# Patient Record
Sex: MALE | Race: WHITE | NOT HISPANIC OR LATINO | Employment: FULL TIME | URBAN - METROPOLITAN AREA
[De-identification: names, ages, dates, MRNs, and addresses within clinical notes are randomized per-mention and may not be internally consistent; named-entity substitution may affect disease eponyms.]

---

## 2017-02-22 ENCOUNTER — GENERIC CONVERSION - ENCOUNTER (OUTPATIENT)
Dept: OTHER | Facility: OTHER | Age: 42
End: 2017-02-22

## 2017-03-23 ENCOUNTER — ALLSCRIPTS OFFICE VISIT (OUTPATIENT)
Dept: OTHER | Facility: OTHER | Age: 42
End: 2017-03-23

## 2017-03-23 ENCOUNTER — APPOINTMENT (OUTPATIENT)
Dept: LAB | Facility: CLINIC | Age: 42
End: 2017-03-23
Payer: COMMERCIAL

## 2017-03-23 ENCOUNTER — TRANSCRIBE ORDERS (OUTPATIENT)
Dept: LAB | Facility: CLINIC | Age: 42
End: 2017-03-23

## 2017-03-23 DIAGNOSIS — M25.541 ARTHRALGIA OF RIGHT HAND: ICD-10-CM

## 2017-03-23 LAB
BASOPHILS # BLD AUTO: 0.02 THOUSANDS/ΜL (ref 0–0.1)
BASOPHILS NFR BLD AUTO: 0 % (ref 0–1)
CRP SERPL QL: <3 MG/L
EOSINOPHIL # BLD AUTO: 0.04 THOUSAND/ΜL (ref 0–0.61)
EOSINOPHIL NFR BLD AUTO: 1 % (ref 0–6)
ERYTHROCYTE [DISTWIDTH] IN BLOOD BY AUTOMATED COUNT: 12.2 % (ref 11.6–15.1)
ERYTHROCYTE [SEDIMENTATION RATE] IN BLOOD: 2 MM/HOUR (ref 0–10)
HCT VFR BLD AUTO: 46.9 % (ref 36.5–49.3)
HGB BLD-MCNC: 16.2 G/DL (ref 12–17)
LYMPHOCYTES # BLD AUTO: 1.63 THOUSANDS/ΜL (ref 0.6–4.47)
LYMPHOCYTES NFR BLD AUTO: 31 % (ref 14–44)
MCH RBC QN AUTO: 31.2 PG (ref 26.8–34.3)
MCHC RBC AUTO-ENTMCNC: 34.5 G/DL (ref 31.4–37.4)
MCV RBC AUTO: 90 FL (ref 82–98)
MONOCYTES # BLD AUTO: 0.35 THOUSAND/ΜL (ref 0.17–1.22)
MONOCYTES NFR BLD AUTO: 7 % (ref 4–12)
NEUTROPHILS # BLD AUTO: 3.31 THOUSANDS/ΜL (ref 1.85–7.62)
NEUTS SEG NFR BLD AUTO: 61 % (ref 43–75)
NRBC BLD AUTO-RTO: 0 /100 WBCS
PLATELET # BLD AUTO: 262 THOUSANDS/UL (ref 149–390)
PMV BLD AUTO: 10.3 FL (ref 8.9–12.7)
RBC # BLD AUTO: 5.19 MILLION/UL (ref 3.88–5.62)
WBC # BLD AUTO: 5.35 THOUSAND/UL (ref 4.31–10.16)

## 2017-03-23 PROCEDURE — 86618 LYME DISEASE ANTIBODY: CPT

## 2017-03-23 PROCEDURE — 85025 COMPLETE CBC W/AUTO DIFF WBC: CPT

## 2017-03-23 PROCEDURE — 86430 RHEUMATOID FACTOR TEST QUAL: CPT

## 2017-03-23 PROCEDURE — 85652 RBC SED RATE AUTOMATED: CPT

## 2017-03-23 PROCEDURE — 86038 ANTINUCLEAR ANTIBODIES: CPT

## 2017-03-23 PROCEDURE — 36415 COLL VENOUS BLD VENIPUNCTURE: CPT

## 2017-03-23 PROCEDURE — 86140 C-REACTIVE PROTEIN: CPT

## 2017-03-24 ENCOUNTER — GENERIC CONVERSION - ENCOUNTER (OUTPATIENT)
Dept: OTHER | Facility: OTHER | Age: 42
End: 2017-03-24

## 2017-03-24 LAB
B BURGDOR IGG SER IA-ACNC: 0.07
B BURGDOR IGM SER IA-ACNC: 0.16
RHEUMATOID FACT SER QL LA: NEGATIVE
RYE IGE QN: NEGATIVE

## 2017-04-02 ENCOUNTER — HOSPITAL ENCOUNTER (OUTPATIENT)
Dept: RADIOLOGY | Facility: HOSPITAL | Age: 42
Discharge: HOME | End: 2017-04-02
Payer: COMMERCIAL

## 2017-04-02 DIAGNOSIS — M25.541 ARTHRALGIA OF RIGHT HAND: ICD-10-CM

## 2017-04-02 PROCEDURE — 73130 X-RAY EXAM OF HAND: CPT

## 2017-04-04 ENCOUNTER — GENERIC CONVERSION - ENCOUNTER (OUTPATIENT)
Dept: OTHER | Facility: OTHER | Age: 42
End: 2017-04-04

## 2017-08-03 ENCOUNTER — APPOINTMENT (OUTPATIENT)
Dept: LAB | Facility: CLINIC | Age: 42
End: 2017-08-03
Payer: COMMERCIAL

## 2017-08-03 ENCOUNTER — ALLSCRIPTS OFFICE VISIT (OUTPATIENT)
Dept: OTHER | Facility: OTHER | Age: 42
End: 2017-08-03

## 2017-08-03 ENCOUNTER — TRANSCRIBE ORDERS (OUTPATIENT)
Dept: LAB | Facility: CLINIC | Age: 42
End: 2017-08-03

## 2017-08-03 DIAGNOSIS — R53.81 MALAISE: Primary | ICD-10-CM

## 2017-08-03 DIAGNOSIS — M25.50 PAIN IN JOINT: ICD-10-CM

## 2017-08-03 DIAGNOSIS — R53.81 OTHER MALAISE: ICD-10-CM

## 2017-08-03 DIAGNOSIS — R53.83 OTHER FATIGUE: ICD-10-CM

## 2017-08-03 LAB
25(OH)D3 SERPL-MCNC: 20.3 NG/ML (ref 30–100)
ALBUMIN SERPL BCP-MCNC: 4 G/DL (ref 3.5–5)
ALP SERPL-CCNC: 64 U/L (ref 46–116)
ALT SERPL W P-5'-P-CCNC: 20 U/L (ref 12–78)
ANION GAP SERPL CALCULATED.3IONS-SCNC: 6 MMOL/L (ref 4–13)
AST SERPL W P-5'-P-CCNC: 13 U/L (ref 5–45)
BASOPHILS # BLD AUTO: 0.03 THOUSANDS/ΜL (ref 0–0.1)
BASOPHILS NFR BLD AUTO: 1 % (ref 0–1)
BILIRUB SERPL-MCNC: 0.4 MG/DL (ref 0.2–1)
BUN SERPL-MCNC: 9 MG/DL (ref 5–25)
CALCIUM SERPL-MCNC: 8.9 MG/DL (ref 8.3–10.1)
CHLORIDE SERPL-SCNC: 103 MMOL/L (ref 100–108)
CO2 SERPL-SCNC: 32 MMOL/L (ref 21–32)
CREAT SERPL-MCNC: 0.79 MG/DL (ref 0.6–1.3)
CRP SERPL QL: <3 MG/L
EOSINOPHIL # BLD AUTO: 0.06 THOUSAND/ΜL (ref 0–0.61)
EOSINOPHIL NFR BLD AUTO: 1 % (ref 0–6)
ERYTHROCYTE [DISTWIDTH] IN BLOOD BY AUTOMATED COUNT: 12.9 % (ref 11.6–15.1)
ERYTHROCYTE [SEDIMENTATION RATE] IN BLOOD: 1 MM/HOUR (ref 0–10)
GFR SERPL CREATININE-BSD FRML MDRD: 112 ML/MIN/1.73SQ M
GLUCOSE SERPL-MCNC: 109 MG/DL (ref 65–140)
HCT VFR BLD AUTO: 44.1 % (ref 36.5–49.3)
HGB BLD-MCNC: 14.7 G/DL (ref 12–17)
LYMPHOCYTES # BLD AUTO: 1.59 THOUSANDS/ΜL (ref 0.6–4.47)
LYMPHOCYTES NFR BLD AUTO: 30 % (ref 14–44)
MCH RBC QN AUTO: 30 PG (ref 26.8–34.3)
MCHC RBC AUTO-ENTMCNC: 33.3 G/DL (ref 31.4–37.4)
MCV RBC AUTO: 90 FL (ref 82–98)
MONOCYTES # BLD AUTO: 0.45 THOUSAND/ΜL (ref 0.17–1.22)
MONOCYTES NFR BLD AUTO: 9 % (ref 4–12)
NEUTROPHILS # BLD AUTO: 3.12 THOUSANDS/ΜL (ref 1.85–7.62)
NEUTS SEG NFR BLD AUTO: 59 % (ref 43–75)
PLATELET # BLD AUTO: 265 THOUSANDS/UL (ref 149–390)
PMV BLD AUTO: 9.6 FL (ref 8.9–12.7)
POTASSIUM SERPL-SCNC: 4.3 MMOL/L (ref 3.5–5.3)
PROT SERPL-MCNC: 6.9 G/DL (ref 6.4–8.2)
RBC # BLD AUTO: 4.9 MILLION/UL (ref 3.88–5.62)
SODIUM SERPL-SCNC: 141 MMOL/L (ref 136–145)
TSH SERPL DL<=0.05 MIU/L-ACNC: 1.13 UIU/ML (ref 0.36–3.74)
WBC # BLD AUTO: 5.25 THOUSAND/UL (ref 4.31–10.16)

## 2017-08-03 PROCEDURE — 86803 HEPATITIS C AB TEST: CPT

## 2017-08-03 PROCEDURE — 85652 RBC SED RATE AUTOMATED: CPT

## 2017-08-03 PROCEDURE — 36415 COLL VENOUS BLD VENIPUNCTURE: CPT

## 2017-08-03 PROCEDURE — 86704 HEP B CORE ANTIBODY TOTAL: CPT

## 2017-08-03 PROCEDURE — 86705 HEP B CORE ANTIBODY IGM: CPT

## 2017-08-03 PROCEDURE — 87389 HIV-1 AG W/HIV-1&-2 AB AG IA: CPT

## 2017-08-03 PROCEDURE — 82306 VITAMIN D 25 HYDROXY: CPT

## 2017-08-03 PROCEDURE — 80053 COMPREHEN METABOLIC PANEL: CPT

## 2017-08-03 PROCEDURE — 81374 HLA I TYPING 1 ANTIGEN LR: CPT

## 2017-08-03 PROCEDURE — 85025 COMPLETE CBC W/AUTO DIFF WBC: CPT

## 2017-08-03 PROCEDURE — 86140 C-REACTIVE PROTEIN: CPT

## 2017-08-03 PROCEDURE — 86200 CCP ANTIBODY: CPT

## 2017-08-03 PROCEDURE — 84443 ASSAY THYROID STIM HORMONE: CPT

## 2017-08-03 PROCEDURE — 87340 HEPATITIS B SURFACE AG IA: CPT

## 2017-08-04 LAB
HBV CORE AB SER QL: NORMAL
HBV CORE IGM SER QL: NORMAL
HBV SURFACE AG SER QL: NORMAL
HCV AB SER QL: NORMAL

## 2017-08-05 LAB
CCP IGA+IGG SERPL IA-ACNC: 2 UNITS (ref 0–19)
HIV 1+2 AB+HIV1 P24 AG SERPL QL IA: NORMAL

## 2017-08-08 LAB — HLA-B27 QL NAA+PROBE: NEGATIVE

## 2017-10-23 ENCOUNTER — ALLSCRIPTS OFFICE VISIT (OUTPATIENT)
Dept: OTHER | Facility: OTHER | Age: 42
End: 2017-10-23

## 2018-01-11 NOTE — PROGRESS NOTES
Assessment    1  Polyarthralgia (719 49) (M25 50)   2  Malaise and fatigue (780 79) (R53 81,R53 83)   3  Sacroiliitis (720 2) (M46 1)   4  Aortic valve disorder (424 1) (I35 9)   5  Anxiety disorder (300 00) (F41 9)    Plan  Malaise and fatigue    · (1) CBC/PLT/DIFF; Status:Active; Requested for:24Hqm6064;    · (1) CHRONIC HEPATITIS PANEL; Status:Active; Requested for:58Omd6530;    · (1) COMPREHENSIVE METABOLIC PANEL; Status:Active; Requested for:69Vug6104;    · (1) C-REACTIVE PROTEIN; Status:Active; Requested ZHM:27XOM3995;    · (1) CYCLIC CITRULLINATED PEPTIDE; Status:Active; Requested for:86Eqr7515;    · (1) HIV AG/AB COMBO, 4TH GEN; [Do Not Release]; Status:Canceled;    · (1) HLA B27; Status:Active; Requested for:19Wch2228;    · (1) SED RATE; Status:Active; Requested for:83Vis8231;    · (1) TSH; Status:Active; Requested for:58Tfh5400;    · (1) VITAMIN D 25-HYDROXY; Status:Active; Requested for:17Ekv8285;    · Call [phone] if: The pain seems worse ; Status:Complete;   Done: 50WYR7393   · Call [phone] if: The symptoms seem worse ; Status:Complete;   Done:  76FDI9309   · Call [phone] if: You have questions or concerns about your problem ;  Status:Complete;   Done: 20NVM0676   · Follow-up visit in 1 month Evaluation and Treatment  Follow-up  Status: Complete  Done:  88UKB2085  Polyarthralgia    · (1) HIV AG/AB COMBO, 4TH GEN; [Do Not Release]; Status:Active; Requested  for:31Oad6821;     Discussion/Summary    This is a 81-year-old  gentleman presenting today for initial evaluation for an episode of right hand pain that has been present for almost a year  He states that last fall he began having right hand pain with sudden onset  He states that he has had consistent pain in his hands since however does note that the colder weather makes his pain worse  He also states that he has noticed some swelling in the fourth and fifth digit of the right hand   He also describes a history of low back pain for the last 2 years as well as bilateral knee pain  The patient denies any further obvious joint swelling but does have morning stiffness lasting about an hour however there are days where the stiffness lasts all day  He states that he has no difficulty with sleep but does note some non-restorative sleep as well as fatigue  He was seen by his primary care physician where blood work was ordered as well as x-rays  He states that he has not utilized any over-the-counter or prescription medications for his joint discomfort  He has not had any injury to his hand  The patient does however state that he completed physical therapy for the diagnosis of sacroiliitis with improvement  On physical examination, there is no active synovitis  Patient has no tenderness of his hands however does have some restricted range of motion of the right knee  He does have normal passive range of motion of both upper extremities as well as the left lower extremity  He has 5 out of 5 strength in the upper and lower extremities  There is some or stricture range of motion of lumbar spine with extension  Patient has crepitus of bilateral knees  Patient's labs reveal a Lyme, RADHA, and rheumatoid factor to be negative  Patient's last CBC, CMP, sedimentation rate, and CRP were within normal range  Patient had a negative HIV  At this time patient's history and physical examination is not consistent with any specific autoimmune condition  Due to patient's history of low back pain as well as bilateral knee pain, we will obtain further labs including a CBC, CMP, CRP, ESR, TSH, vitamin D level, as well as a CCP and HLA-B27  In addition I will obtain a hepatitis panel and HIV  Patient will plan to return to the office in one month time for further evaluation  In addition he was offered an anti-inflammatory for his current joint complaints however he declined        Counseling  Rheumatology Counseling Documentation: The patient was counseled regarding diagnostic results, instructions for management, prognosis, patient and family education, risks and benefits of treatment options and importance of compliance with treatment  Chief Complaint  Polyarthralgia      History of Present Illness  This is a 40year old male  presenting today for initial evaluation for episode of pain in the right hand x almost a year  Started on of the sudden  hand has been consistent  Some knee pain  +Swelling in the right hand main the 5th and 4th digit  Worse with colder weather  no other obvious joint swelling  +Am stiffness x 1 hour to the whole day  Improvement with warmer weather  no difficulty with sleep  +some non restorative sleep  +Some fatigue  No OTC medication for pain  Had blood work and Xrays  no injury to the hand  History of sacroiliitis and has had PT with relief  Review of Systems    Constitutional: fatigue, but no fever, no recent weight gain, no chills, no recent weight loss and no anorexia  HEENT: feeling congested and sore throat, but no blurred vision, no double vision, no amaurosis fugax, no dryness of the eyes, no eye pain, no erythema eye(s), no dryness mouth and no mouth sores  Cardiovascular: no chest pain or pressure, no dyspnea on exertion and no swelling in the arms or legs  Respiratory: no shortness of breath and no pleurisy    The patient presents with complaints of unusual or persistent cough, described as dry  Gastrointestinal: no abdominal pain, no nausea, no vomiting, no heartburn, no diarrhea, no constipation, no melena and no BRBPR  Genitourinary: no foamy urine and increased frequency, but no dysuria and no hematuria  Musculoskeletal: as noted in HPI  Integumentary nail changes, but no rash, no Raynaud's, no alopecia and no photosensitivity  Endocrine no polyuria and no polydipsia  Hematologic/Lymphatic: no unusual bleeding and no tendency for easy bruising     Neurological: headache, but no vertigo or dizziness, no tingling and no weakness  Psychiatric: insomnia, non-restorative sleep, depression and anxiety  Active Problems    1  Anxiety disorder (300 00) (F41 9)   2  Aortic valve disorder (424 1) (I35 9)   3  Arthralgia of right hand (719 44) (M25 541)   4  Cough (786 2) (R05)   5  Cough variant asthma (493 82) (J45 991)   6  Flu vaccine need (V04 81) (Z23)   7  Lumbar spondylolysis (738 4) (M43 06)   8  Sacroiliitis (720 2) (M46 1)   9  Seasonal allergies (477 9) (J30 2)    Past Medical History    1  Cough (786 2) (R05)   2  History of Depression (311) (F32 9)   3  History of acute sinusitis (V12 69) (Z87 09)   4  History of acute sinusitis (V12 69) (Z87 09)   5  History of allergic rhinitis (V12 69) (Z87 09)   6  History of viral gastroenteritis (V12 09) (Z86 19)   7  History of Migraine headache (346 90) (G43 909)   8  History of Palpitations (785 1) (R00 2)   9  History of Postnasal drip (784 91) (R09 82)   10  History of Sorethroat (462) (J02 9)    Surgical History    1  History of Tonsillectomy    Family History  Mother    1  Family history of Depression  Brother    2  Family history of Adopted  Family History    3  Denied: Family history of Crohn's disease   4  Denied: Family history of psoriasis   5  Denied: Family history of rheumatoid arthritis   6  Denied: Family history of systemic lupus erythematosus   7  Denied: Family history of ulcerative colitis   8  Family history of Father  At Age ___    Social History    · Alcohol Use (History)   · Denied: Drug Use (305 90)   · Employed   · Never smoker    Current Meds   1  Flonase 50 MCG/ACT SUSP; Therapy: (Recorded:25Obr8338) to Recorded   2  LORazepam 0 5 MG Oral Tablet; Take 1 tablet daily; Therapy: (Jas Burgos) to Recorded   3  Vitamin B12 TABS; 1 tablet every other day; Therapy: (Recorded:67Xdz6034) to Recorded    Allergies    1   Iron    Vitals  Signs   Recorded: 51Mmh8136 01:49PM   Heart Rate: 80  Systolic: 220  Diastolic: 60  Height: 5 ft 11 in  Weight: 152 lb 2 oz  BMI Calculated: 21 22  BSA Calculated: 1 88    Physical Exam    Constitutional   General appearance: No acute distress, well appearing and well nourished  Eyes   Conjunctiva and lids: No swelling, erythema or discharge  Pupils and irises: Equal, round and reactive to light  Ears, Nose, Mouth, and Throat   External inspection of ears and nose: Normal     Oropharynx: Normal with no erythema, edema, exudate lesions, or ulcers  Pulmonary   Respiratory effort: No increased work of breathing or signs of respiratory distress  Auscultation of lungs: Clear to auscultation  Cardiovascular   Auscultation of heart: Normal rate and rhythm, normal S1 and S2, without murmurs  Examination of extremities for edema and/or varicosities: Normal     Lymphatic   Palpation of lymph nodes in neck: No lymphadenopathy  Psychiatric   Orientation to person, place, and time: Normal     Mood and affect: Normal         Right knee restricted ROM  Right Upper Extremity: Normal ROM  Left Upper Extremity: Normal ROM  Left Lower Extremity: Normal ROM  Musculoskeletal - Joints, bones, and muscles: Abnormal  Palpation - bilateral knee crepitus  Muscle strength/tone: Normal    Right upper extremity: shoulder abduction 5/5, biceps 5/5, triceps 5/5, but normal hand   Left upper extremity shoulder abduction 5/5, biceps 5/5, triceps 5/5, but normal hand   Right lower extremity strength: hip flexion 5/5, hip internal rotation 5/5, hip external rotation 5/5, knee flexion 5/5, knee extension 5/5, ankle dorsiflexion 5/5, ankle plantar flexion 5/5  Left lower extremity strength: hip flexion 5/5, hip internal rotation 5/5, hip external rotation 5/5, knee flexion 5/5, knee extension 5/5, ankle dorsiflexion 5/5, ankle plantar flexion 5/5  Additional Findings - Restricted ROM of lumbar spine with extension due to discomfort       Right hand: All MCP, PIP and DIP joints are normal  Left Hand: All MCP, PIP and DIP joints are normal    Right foot: All MTP, PIP and DIP joints are normal  Left foot: All MTP, PIP and DIP joints are normal       Results/Data  (1) CBC/PLT/DIFF [id number] 11:15AM Ravi Jiménez Order Number: [id number]     Test Name Result Flag Reference   WBC COUNT 5 35 Thousand/uL  4 31-10 16   RBC COUNT 5 19 Million/uL  3 88-5 62   HEMOGLOBIN 16 2 g/dL  12 0-17 0   HEMATOCRIT 46 9 %  36 5-49 3   MCV 90 fL  82-98   MCH 31 2 pg  26 8-34 3   MCHC 34 5 g/dL  31 4-37 4   RDW 12 2 %  11 6-15 1   MPV 10 3 fL  8 9-12 7   PLATELET COUNT 748 Thousands/uL  149-390   nRBC AUTOMATED 0 /100 WBCs     NEUTROPHILS RELATIVE PERCENT 61 %  43-75   LYMPHOCYTES RELATIVE PERCENT 31 %  14-44   MONOCYTES RELATIVE PERCENT 7 %  4-12   EOSINOPHILS RELATIVE PERCENT 1 %  0-6   BASOPHILS RELATIVE PERCENT 0 %  0-1   NEUTROPHILS ABSOLUTE COUNT 3 31 Thousands/? ??L  1 85-7 62   LYMPHOCYTES ABSOLUTE COUNT 1 63 Thousands/? ??L  0 60-4 47   MONOCYTES ABSOLUTE COUNT 0 35 Thousand/? ??L  0 17-1 22   EOSINOPHILS ABSOLUTE COUNT 0 04 Thousand/? ??L  0 00-0 61   BASOPHILS ABSOLUTE COUNT 0 02 Thousands/? ??L  0 00-0 10   - Patient Instructions: This bloodwork is non-fasting  Please drink two glasses of water morning of bloodwork  - Patient Instructions: This bloodwork is non-fasting  Please drink two glasses of water morning of bloodwork       (1) RHEUMATOID FACTOR SCREEN 13RFI5218 11:15AM Ziebelette Ohm   TW Order Number: [id number]     Test Name Result Flag Reference   RHEUMATOID FACTOR Negative  Negative     (1) SED RATE 74YQT7941 11:15AM Ziebelette Ohm   TW Order Number: [id number]     Test Name Result Flag Reference   SED RATE 2 mm/hour  0-10     (1) C-REACTIVE PROTEIN [id number] 11:15AM [facility] Order Number: [id number]     Test Name Result Flag Reference   C-REACT PROTEIN <3 0 mg/L  <3 0     (1) RADHA SCREEN W/REFLEX TO TITER/PATTERN [id number] 11: 93GT Bev Coburn   TW Order Number: [id number]     Test Name Result Flag Reference   RADHA SCREEN  Negative  Negative     (1) LYME ANTIBODY PROFILE W/REFLEX TO WESTERN BLOT [id number] 11:15AM Bev Coburn    Order Number: [id number]     Test Name Result Flag Reference   LYME IGG 0 07  0 00-0 79   NEGATIVE(0 00-0 79)-Absence of detectable Borrelia IgG Antibodies  A negative result does not exclude the possibility of Borrelia infection  If early Lyme disease is suspected,a second sample should be collected & tested 4 weeks after initial testing  LYME IGM 0 16  0 00-0 79   NEGATIVE (0 00-0 79)-Absence of detectable Borrelia IgM antibodies  A negative result does not exclude the possibility of Borrelia infection  If early lyme disease is suspected, a second sample should be collected & tested 4 weeks after initial testing  Attending Note  Collaborating Physician: I did not interview and examine the patient, I discussed the case with the Advanced Practitioner and reviewed the note and I agree with the Advanced Practitioner note  Future Appointments    Date/Time Provider Specialty Site   08/08/2017 01:00 PM Karen Randhawa DO Rheumatology [facility]   09/12/2017 04:00 PM LÓPEZ Lopez Rheumatology [address]     Signatures   Electronically signed by : Farooq Cantrell [facility];  Aug  3 2017  2:40PM EST                       (Author)    Electronically signed by : Kaylie Mukherjee DO; Aug  3 2017  4:24PM EST                       (Author)

## 2018-01-12 NOTE — RESULT NOTES
Verified Results  (1) CBC/PLT/DIFF [id number] 11:15AM Vijay Liu Order Number: [id number]     Test Name Result Flag Reference   WBC COUNT 5 35 Thousand/uL  4 31-10 16   RBC COUNT 5 19 Million/uL  3 88-5 62   HEMOGLOBIN 16 2 g/dL  12 0-17 0   HEMATOCRIT 46 9 %  36 5-49 3   MCV 90 fL  82-98   MCH 31 2 pg  26 8-34 3   MCHC 34 5 g/dL  31 4-37 4   RDW 12 2 %  11 6-15 1   MPV 10 3 fL  8 9-12 7   PLATELET COUNT 603 Thousands/uL  149-390   nRBC AUTOMATED 0 /100 WBCs     NEUTROPHILS RELATIVE PERCENT 61 %  43-75   LYMPHOCYTES RELATIVE PERCENT 31 %  14-44   MONOCYTES RELATIVE PERCENT 7 %  4-12   EOSINOPHILS RELATIVE PERCENT 1 %  0-6   BASOPHILS RELATIVE PERCENT 0 %  0-1   NEUTROPHILS ABSOLUTE COUNT 3 31 Thousands/? ??L  1 85-7 62   LYMPHOCYTES ABSOLUTE COUNT 1 63 Thousands/? ??L  0 60-4 47   MONOCYTES ABSOLUTE COUNT 0 35 Thousand/? ??L  0 17-1 22   EOSINOPHILS ABSOLUTE COUNT 0 04 Thousand/? ??L  0 00-0 61   BASOPHILS ABSOLUTE COUNT 0 02 Thousands/? ??L  0 00-0 10   - Patient Instructions: This bloodwork is non-fasting  Please drink two glasses of water morning of bloodwork  - Patient Instructions: This bloodwork is non-fasting  Please drink two glasses of water morning of bloodwork       (1) RHEUMATOID FACTOR SCREEN [id number] 11:15AM Noemi Mantua    Order Number: [id number]     Test Name Result Flag Reference   RHEUMATOID FACTOR Negative  Negative     (1) SED RATE [id number] 11:15AM Noemi Mantua   TW Order Number: [id number]     Test Name Result Flag Reference   SED RATE 2 mm/hour  0-10     (1) C-REACTIVE PROTEIN [id number] 11:15AM localbaconricky Mantua    Order Number: [id number]     Test Name Result Flag Reference   C-REACT PROTEIN <3 0 mg/L  <3 0

## 2018-01-14 VITALS
HEART RATE: 72 BPM | BODY MASS INDEX: 20.58 KG/M2 | WEIGHT: 147 LBS | RESPIRATION RATE: 18 BRPM | DIASTOLIC BLOOD PRESSURE: 76 MMHG | HEIGHT: 71 IN | SYSTOLIC BLOOD PRESSURE: 112 MMHG

## 2018-01-14 NOTE — RESULT NOTES
Verified Results  (1) RADHA SCREEN W/REFLEX TO TITER/PATTERN [id number] 11:15AM Belkys Hinojosa    Order Number: [id number]     Test Name Result Flag Reference   RADHA SCREEN  Negative  Negative     (1) LYME ANTIBODY PROFILE W/REFLEX TO WESTERN BLOT [id number] 11:15AM Belkys Hinojosa    Order Number: [id number]     Test Name Result Flag Reference   LYME IGG 0 07  0 00-0 79   NEGATIVE(0 00-0 79)-Absence of detectable Borrelia IgG Antibodies  A negative result does not exclude the possibility of Borrelia infection  If early Lyme disease is suspected,a second sample should be collected & tested 4 weeks after initial testing  LYME IGM 0 16  0 00-0 79   NEGATIVE (0 00-0 79)-Absence of detectable Borrelia IgM antibodies  A negative result does not exclude the possibility of Borrelia infection  If early lyme disease is suspected, a second sample should be collected & tested 4 weeks after initial testing

## 2018-01-15 VITALS
HEART RATE: 80 BPM | SYSTOLIC BLOOD PRESSURE: 102 MMHG | HEIGHT: 71 IN | BODY MASS INDEX: 21.3 KG/M2 | WEIGHT: 152.13 LBS | DIASTOLIC BLOOD PRESSURE: 60 MMHG

## 2018-01-15 NOTE — RESULT NOTES
Verified Results  * XR HAND 3+ VIEW RIGHT 02Apr2017 01:30PM Екатерина PAZ Order Number: [id number]     Test Name Result Flag Reference   XR HAND 3+ VW RIGHT (Report)     RIGHT HAND     INDICATION: Right hand pain  COMPARISON: None     VIEWS: 3     IMAGES: 3     For the purposes of institution wide universal language the following terms will apply: (thumb=1st digit/finger, index finger=2nd digit/finger, long finger=3rd digit/finger, ring=4th digit/finger and small finger=5th digit/finger)     FINDINGS:     There is no acute fracture or dislocation  No degenerative changes  No lytic or blastic lesions are seen  Soft tissues are unremarkable  IMPRESSION:     No acute osseous abnormality         Workstation performed: [id number]     Signed by:   Dominique Hahn MD   4/4/17

## 2018-01-17 NOTE — PROGRESS NOTES
Assessment    1  Never smoker   2  Anxiety disorder (300 00) (F41 9)   3  Vitamin D deficiency (268 9) (E55 9)   4  Aortic valve disorder (424 1) (I35 9)   · QUADRICUSPID WITH MILD AORTIC INSUFF  Plan  Anxiety disorder    · From  LORazepam 0 5 MG Oral Tablet Take 1 tablet daily To LORazepam 0 5  MG Oral Tablet TAKE 1 TABLET Daily PRN anxiety/panic    Discussion/Summary  Impression: health maintenance visit, healthy adult male  Currently, he eats a healthy diet and have encouraged the patient on exercising at least 30 minutes 5 days weekly, I have reviewed with him his cardiologist restrictions and encouragement to exercise just not isometric  Prostate cancer screening: PSA is not indicated  Testing was done today for patient declines STI testing today  Colorectal cancer screening: colorectal cancer screening is not indicated  Screening lab work includes will request labs from [facility] or [facility] labs that were done in December 2016  The patient declines the flu shot, his tetanus will be due in 2019  He was advised to be evaluated by recommend regular follow-up with Ophthalmology and dentistry  Advice and education were given regarding nutrition, aerobic exercise, weight bearing exercise, vitamin D supplements, reproductive health, cardiovascular risk reduction and helmet use  This is a healthy 78-year-old male who is here for a health maintenance visit, overall he is doing very well  1  Anxiety  the patient has many coping strategies for his anxiety, it is much better controlled than it was in the past, he has been using 1 bottle of Ativan from 2016 and has not yet run out but has maybe 3 tablets left, he is requesting a refill on this medication, it sounds like he takes it less than once a month, which is appropriate  I have checked the PD MP, it is appropriate to make him a refill today, he will sign a controlled substances contract with our office and has no further questions about this   I did  him that this could potentially be a drug of abuse or misuse, and he will use it judiciously in keep it locked away  2  vitamin-D deficiency  I have encouraged the patient to continue to take his vitamin-D supplementation as prescribed by Rheumatology   3  aortic valve disorder  the patient continues to follow with [facility], the last note from their office in December 2016 was reviewed with him today    encouraged the patient to follow up annually or sooner if needed  The patient was counseled regarding diagnostic results, instructions for management, risk factor reductions, prognosis, patient and family education, impressions, risks and benefits of treatment options, importance of compliance with treatment  Possible side effects of new medications were reviewed with the patient/guardian today (Specifically, benzos: Risk of dependence, risk of somnolence, risk of abuse, discussed using judiciously)  The treatment plan was reviewed with the patient/guardian  The patient/guardian understands and agrees with the treatment plan     Self Referrals: No      Chief Complaint  Physical      History of Present Illness  HM, Adult Male: The patient is being seen for a health maintenance evaluation  The last health maintenance visit was several year(s) ago  Social History: Household members include spouse and dog (rescue)  He is   Work status: working full time and occupation:   The patient has never smoked cigarettes  He reports drinking 2-4 drinks per week  He has never used illicit drugs  General Health: The patient's health since the last visit is described as good  He has regular dental visits  He denies vision problems  He denies hearing loss  Immunizations status: not up to date   declines flu shot  Lifestyle:  He consumes a diverse and healthy diet  He does not have any weight concerns  He does not exercise regularly   (very active in his jobs and on weekends "always on the go"  Lots of walking)   He does not use tobacco  He denies alcohol use  He denies drug use  Reproductive health:  the patient is sexually active  He is monogamous with a male partner  He denies erectile dysfunction  Screening: Prostate cancer screening includes no previous prostate-specific antigen testing  Testicular cancer screening includes monthly self testicular examinations  Colorectal cancer screening includes no previous screening  Metabolic screening includes lipid profile performed within the past five years, glucose screening performed last year, thyroid function test performed last year and no previous DEXA  Cardiovascular risk factors: family history of cardiovascular disease, but no hypertension, no diabetes, no high LDL cholesterol, no low HDL cholesterol, no stress, no obesity, no illicit drug use and no sedentary lifestyle  General health risks: no elevated prostate-specific antigen, no undescended testis, no family history of prostate cancer, no previous colon polyps, no inflammatory bowel disease, no osteoporosis risk factors, no asbestos exposure and no radiation exposure  Safety elements used: seat belt, bicycle helmet, safe driving habits, sunscreen, smoke detector, carbon monoxide detector and hot water temperature less than 120 degrees F, but no CPR training for the patient and no CPR training for household members  Risk findings: anxiety symptoms, but no passive smoke exposure, no unsafe sexual behavior, no chemical abuse, no domestic violence, no guns at home and no depression symptoms  Additional History:  Anxiety: has sometimes in response to specific situations, not as significant as in the past, uses mindfulness techniques and breathing, has been using ativan for several years, takes about 1 pill a month if his other coping strategies don't work, he's willing to sign a Rx Drug Contract today  Review of Systems    Constitutional: no fever and no chills     Eyes: no eyesight problems  ENT: no sore throat, no hearing loss and no nasal discharge  Cardiovascular: no chest pain and no palpitations  Respiratory: no shortness of breath, no cough, no orthopnea, no wheezing and no shortness of breath during exertion  Gastrointestinal: No complaints of abdominal pain, no constipation, no nausea or vomiting, no diarrhea or bloody stools  Genitourinary: no dysuria and no incontinence  Musculoskeletal: +R hand pain, worse in the winter time/with cold  Integumentary: follows with derm (Kassi Spotted), but no rashes and no skin lesions  Neurological: no headache and no confusion  Psychiatric: anxiety, but no depression and no emotional problems  Endocrine: no muscle weakness and no erectile dysfunction  Hematologic/Lymphatic: no swollen glands, no tendency for easy bleeding and no tendency for easy bruising  Active Problems    1  Anxiety disorder (300 00) (F41 9)   2  Aortic valve disorder (424 1) (I35 9)   3  Arthralgia of right hand (719 44) (M25 541)   4  Lumbar spondylolysis (738 4) (M43 06)   5  Polyarthralgia (719 49) (M25 50)   6  Sacroiliitis (720 2) (M46 1)   7  Seasonal allergies (477 9) (J30 2)   8   Vitamin D deficiency (268 9) (E55 9)    Past Medical History    · Cough (786 2) (R05)   · History of Cough variant asthma (493 82) (J45 991)   · History of Depression (311) (F32 9)   · History of acute sinusitis (V12 69) (Z87 09)   · History of acute sinusitis (V12 69) (Z87 09)   · History of allergic rhinitis (V12 69) (Z87 09)   · History of cough   · History of influenza vaccination (V49 89) (Z92 29)   · History of viral gastroenteritis (V12 09) (Z86 19)   · History of Malaise and fatigue (780 79) (R53 81,R53 83)   · History of Migraine headache (346 90) (G43 909)   · History of Palpitations (785 1) (R00 2)   · History of Postnasal drip (784 91) (R09 82)   · History of Sorethroat (462) (J02 9)    Surgical History    · History of Tonsillectomy    Family History  Mother    · Family history of Depression  Brother    · Family history of Adopted  Family History    · Denied: Family history of Crohn's disease   · Denied: Family history of psoriasis   · Denied: Family history of rheumatoid arthritis   · Denied: Family history of systemic lupus erythematosus   · Denied: Family history of ulcerative colitis   · Family history of Father  At Age ___    Social History    · Alcohol Use (History)   · 3-4 DRINKS/WEEKLY   · Denied: Drug Use (305 90)   · 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[employer]   · Never smoker   · Vegan diet (V49 89) (Z78 9)    Current Meds   1  Flonase 50 MCG/ACT SUSP; Therapy: (Recorded:[id number]) to Recorded   2  LORazepam 0 5 MG Oral Tablet; Take 1 tablet daily; Therapy: (Maryln Ring) to Recorded   3  Vitamin B12 TABS; 1 tablet every other day; Therapy: (Maryln Ring) to Recorded   4  Vitamin D3 2000 UNIT Oral Tablet; Take 1 tablet daily; Therapy: [id number] to (Last Rx:[id number]) Ordered    Allergies    1  Iron    Vitals   Recorded: [id number] 08:05AM   Heart Rate 78   Systolic 506   Diastolic 76   Height 5 ft 11 1 in   Weight 148 lb 8 oz   BMI Calculated 20 65   BSA Calculated 1 86     Physical Exam    Constitutional   General appearance: No acute distress, well appearing and well nourished  Thin  Head and Face   Head and face: Normal     Palpation of the face and sinuses: No sinus tenderness  Eyes   Conjunctiva and lids: No erythema, swelling or discharge  anicteric  Pupils and irises: Equal, round, reactive to light  extraocular muscles and accommodation intact  Ears, Nose, Mouth, and Throat   External inspection of ears and nose: Normal     Otoscopic examination: Tympanic membranes translucent with normal light reflex  Canals patent without erythema  Hearing: Normal     Nasal mucosa, septum, and turbinates: Normal without edema or erythema  Lips, teeth, and gums: Normal, good dentition      Oropharynx: Normal with no erythema, edema, exudate or lesions  Neck   Neck: Supple, symmetric, trachea midline, no masses  no lymphadenopathy  Thyroid: Normal, no thyromegaly  nontender, no palpable enlargement or nodularity  Pulmonary   Respiratory effort: No increased work of breathing or signs of respiratory distress  Auscultation of lungs: Clear to auscultation  no wheezes rales or rhonchi  Cardiovascular   Auscultation of heart: Normal rate and rhythm, normal S1 and S2, no murmurs  Carotid pulses: 2+ bilaterally  no bruit  Examination of extremities for edema and/or varicosities: Normal     Abdomen   Abdomen: Non-tender, no masses  Lymphatic   Palpation of lymph nodes in neck: No lymphadenopathy  Musculoskeletal   Gait and station: Normal     Inspection/palpation of digits and nails: Normal without clubbing or cyanosis  Inspection/palpation of joints, bones, and muscles: Normal     Muscle strength/tone: Normal     Skin   Skin and subcutaneous tissue: Normal without rashes or lesions  Neurologic   Cranial nerves: Cranial nerves 2-12 intact  Psychiatric   Judgment and insight: Normal     Orientation to person, place and time: Normal     Recent and remote memory: Intact      Mood and affect: Normal        Signatures   Electronically signed by : Antnoia Acevedo MD; Oct 23 2017  8:49AM EST                       (Author)

## 2018-01-22 VITALS
HEIGHT: 71 IN | WEIGHT: 148.5 LBS | BODY MASS INDEX: 20.79 KG/M2 | HEART RATE: 78 BPM | SYSTOLIC BLOOD PRESSURE: 122 MMHG | DIASTOLIC BLOOD PRESSURE: 76 MMHG

## 2019-03-02 ENCOUNTER — HOSPITAL ENCOUNTER (EMERGENCY)
Facility: HOSPITAL | Age: 44
Discharge: HOME | End: 2019-03-02
Attending: EMERGENCY MEDICINE | Admitting: EMERGENCY MEDICINE
Payer: COMMERCIAL

## 2019-03-02 ENCOUNTER — APPOINTMENT (EMERGENCY)
Dept: CT IMAGING | Facility: HOSPITAL | Age: 44
End: 2019-03-02
Payer: COMMERCIAL

## 2019-03-02 ENCOUNTER — APPOINTMENT (EMERGENCY)
Dept: RADIOLOGY | Facility: HOSPITAL | Age: 44
End: 2019-03-02
Payer: COMMERCIAL

## 2019-03-02 VITALS
TEMPERATURE: 97.9 F | DIASTOLIC BLOOD PRESSURE: 76 MMHG | WEIGHT: 150 LBS | HEART RATE: 74 BPM | SYSTOLIC BLOOD PRESSURE: 121 MMHG | OXYGEN SATURATION: 96 % | BODY MASS INDEX: 20.86 KG/M2 | RESPIRATION RATE: 16 BRPM

## 2019-03-02 DIAGNOSIS — K20.90 ESOPHAGITIS: ICD-10-CM

## 2019-03-02 DIAGNOSIS — K44.9 HIATAL HERNIA: Primary | ICD-10-CM

## 2019-03-02 LAB
ALBUMIN SERPL BCP-MCNC: 4.4 G/DL (ref 3.5–5)
ALP SERPL-CCNC: 63 U/L (ref 46–116)
ALT SERPL W P-5'-P-CCNC: 24 U/L (ref 12–78)
ANION GAP BLD CALC-SCNC: 16 MMOL/L (ref 4–13)
ANION GAP SERPL CALCULATED.3IONS-SCNC: 8 MMOL/L (ref 4–13)
APTT PPP: 30 SECONDS (ref 26–38)
AST SERPL W P-5'-P-CCNC: 15 U/L (ref 5–45)
ATRIAL RATE: 69 BPM
ATRIAL RATE: 77 BPM
BASOPHILS # BLD AUTO: 0.05 THOUSANDS/ΜL (ref 0–0.1)
BASOPHILS NFR BLD AUTO: 1 % (ref 0–1)
BILIRUB SERPL-MCNC: 0.63 MG/DL (ref 0.2–1)
BUN BLD-MCNC: 10 MG/DL (ref 5–25)
BUN SERPL-MCNC: 10 MG/DL (ref 5–25)
CA-I BLD-SCNC: 1.22 MMOL/L (ref 1.12–1.32)
CALCIUM SERPL-MCNC: 9.4 MG/DL (ref 8.3–10.1)
CHLORIDE BLD-SCNC: 101 MMOL/L (ref 100–108)
CHLORIDE SERPL-SCNC: 103 MMOL/L (ref 100–108)
CO2 SERPL-SCNC: 30 MMOL/L (ref 21–32)
CREAT BLD-MCNC: 0.7 MG/DL (ref 0.6–1.3)
CREAT SERPL-MCNC: 0.73 MG/DL (ref 0.6–1.3)
EOSINOPHIL # BLD AUTO: 0.13 THOUSAND/ΜL (ref 0–0.61)
EOSINOPHIL NFR BLD AUTO: 2 % (ref 0–6)
ERYTHROCYTE [DISTWIDTH] IN BLOOD BY AUTOMATED COUNT: 12.1 % (ref 11.6–15.1)
GFR SERPL CREATININE-BSD FRML MDRD: 114 ML/MIN/1.73SQ M
GFR SERPL CREATININE-BSD FRML MDRD: 116 ML/MIN/1.73SQ M
GLUCOSE SERPL-MCNC: 95 MG/DL (ref 65–140)
GLUCOSE SERPL-MCNC: 95 MG/DL (ref 65–140)
HCT VFR BLD AUTO: 48.6 % (ref 36.5–49.3)
HCT VFR BLD CALC: 48 % (ref 36.5–49.3)
HGB BLD-MCNC: 15.9 G/DL (ref 12–17)
HGB BLDA-MCNC: 16.3 G/DL (ref 12–17)
IMM GRANULOCYTES # BLD AUTO: 0.01 THOUSAND/UL (ref 0–0.2)
IMM GRANULOCYTES NFR BLD AUTO: 0 % (ref 0–2)
INR PPP: 0.96 (ref 0.86–1.17)
LYMPHOCYTES # BLD AUTO: 1.95 THOUSANDS/ΜL (ref 0.6–4.47)
LYMPHOCYTES NFR BLD AUTO: 27 % (ref 14–44)
MCH RBC QN AUTO: 30.8 PG (ref 26.8–34.3)
MCHC RBC AUTO-ENTMCNC: 32.7 G/DL (ref 31.4–37.4)
MCV RBC AUTO: 94 FL (ref 82–98)
MONOCYTES # BLD AUTO: 0.54 THOUSAND/ΜL (ref 0.17–1.22)
MONOCYTES NFR BLD AUTO: 8 % (ref 4–12)
NEUTROPHILS # BLD AUTO: 4.51 THOUSANDS/ΜL (ref 1.85–7.62)
NEUTS SEG NFR BLD AUTO: 62 % (ref 43–75)
NRBC BLD AUTO-RTO: 0 /100 WBCS
P AXIS: 55 DEGREES
P AXIS: 78 DEGREES
PCO2 BLD: 29 MMOL/L (ref 21–32)
PLATELET # BLD AUTO: 265 THOUSANDS/UL (ref 149–390)
PMV BLD AUTO: 9.1 FL (ref 8.9–12.7)
POTASSIUM BLD-SCNC: 3.8 MMOL/L (ref 3.5–5.3)
POTASSIUM SERPL-SCNC: 3.8 MMOL/L (ref 3.5–5.3)
PR INTERVAL: 154 MS
PR INTERVAL: 154 MS
PROT SERPL-MCNC: 7.5 G/DL (ref 6.4–8.2)
PROTHROMBIN TIME: 12.9 SECONDS (ref 11.8–14.2)
QRS AXIS: 89 DEGREES
QRS AXIS: 90 DEGREES
QRSD INTERVAL: 92 MS
QRSD INTERVAL: 92 MS
QT INTERVAL: 360 MS
QT INTERVAL: 370 MS
QTC INTERVAL: 396 MS
QTC INTERVAL: 407 MS
RBC # BLD AUTO: 5.17 MILLION/UL (ref 3.88–5.62)
SODIUM BLD-SCNC: 141 MMOL/L (ref 136–145)
SODIUM SERPL-SCNC: 141 MMOL/L (ref 136–145)
SPECIMEN SOURCE: ABNORMAL
T WAVE AXIS: 56 DEGREES
T WAVE AXIS: 64 DEGREES
TROPONIN I SERPL-MCNC: <0.02 NG/ML
TROPONIN I SERPL-MCNC: <0.02 NG/ML
VENTRICULAR RATE: 69 BPM
VENTRICULAR RATE: 77 BPM
WBC # BLD AUTO: 7.19 THOUSAND/UL (ref 4.31–10.16)

## 2019-03-02 PROCEDURE — 80047 BASIC METABLC PNL IONIZED CA: CPT

## 2019-03-02 PROCEDURE — 93010 ELECTROCARDIOGRAM REPORT: CPT | Performed by: INTERNAL MEDICINE

## 2019-03-02 PROCEDURE — 74174 CTA ABD&PLVS W/CONTRAST: CPT

## 2019-03-02 PROCEDURE — 85610 PROTHROMBIN TIME: CPT | Performed by: EMERGENCY MEDICINE

## 2019-03-02 PROCEDURE — 36415 COLL VENOUS BLD VENIPUNCTURE: CPT | Performed by: EMERGENCY MEDICINE

## 2019-03-02 PROCEDURE — 71275 CT ANGIOGRAPHY CHEST: CPT

## 2019-03-02 PROCEDURE — 80053 COMPREHEN METABOLIC PANEL: CPT | Performed by: EMERGENCY MEDICINE

## 2019-03-02 PROCEDURE — 99285 EMERGENCY DEPT VISIT HI MDM: CPT

## 2019-03-02 PROCEDURE — 93005 ELECTROCARDIOGRAM TRACING: CPT

## 2019-03-02 PROCEDURE — 84484 ASSAY OF TROPONIN QUANT: CPT | Performed by: EMERGENCY MEDICINE

## 2019-03-02 PROCEDURE — 85025 COMPLETE CBC W/AUTO DIFF WBC: CPT | Performed by: EMERGENCY MEDICINE

## 2019-03-02 PROCEDURE — 85730 THROMBOPLASTIN TIME PARTIAL: CPT | Performed by: EMERGENCY MEDICINE

## 2019-03-02 PROCEDURE — 85014 HEMATOCRIT: CPT

## 2019-03-02 RX ORDER — MAGNESIUM HYDROXIDE/ALUMINUM HYDROXICE/SIMETHICONE 120; 1200; 1200 MG/30ML; MG/30ML; MG/30ML
30 SUSPENSION ORAL ONCE
Status: COMPLETED | OUTPATIENT
Start: 2019-03-02 | End: 2019-03-02

## 2019-03-02 RX ORDER — FAMOTIDINE 20 MG/1
20 TABLET, FILM COATED ORAL 2 TIMES DAILY
Qty: 30 TABLET | Refills: 0 | Status: SHIPPED | OUTPATIENT
Start: 2019-03-02 | End: 2019-06-10 | Stop reason: ALTCHOICE

## 2019-03-02 RX ADMIN — ALUMINUM HYDROXIDE, MAGNESIUM HYDROXIDE, AND SIMETHICONE 30 ML: 200; 200; 20 SUSPENSION ORAL at 10:20

## 2019-03-02 RX ADMIN — IOHEXOL 100 ML: 350 INJECTION, SOLUTION INTRAVENOUS at 09:27

## 2019-03-02 NOTE — ED PROVIDER NOTES
History  Chief Complaint   Patient presents with    Chest Pain     Reports that upper back pain (by the "wing area") for a couple, reports radiates into his chest this morning  States father and grandfather  of aortic dissection at early age  Reports quadrocuspid aortic valve  Told by his [place] Cardiologist to come to ED for evaluation  Denies diaphoresis, or dizziness at onset  States, "I can't breathe all the way in " Denies recent illness or fevers  Pt reports pain is a sharp pain  36-year-old male with a history of quadracuspid aortic valve and family history of aortic dissection in his grandfather and father presents with chest pain radiating to his back since Thursday of this week  Patient states he did not come in sooner because he thought it would go away"  He states the pain is in the middle of his back and hurts when he breathes  He has no leg swelling or signs or symptoms of pulmonary embolism    He is not tachycardic any is negative Homans sign bilaterally      History provided by:  Patient  Chest Pain   Pain location:  Substernal area  Pain quality: aching and pressure    Pain severity:  Mild  Onset quality:  Gradual  Duration:  3 days  Timing:  Constant  Progression:  Waxing and waning  Chronicity:  New  Context: movement and at rest    Worsened by:  Exertion and movement  Ineffective treatments:  None tried  Associated symptoms: anxiety    Associated symptoms: no AICD problem, no altered mental status, no anorexia, no back pain, no claudication, no cough, no diaphoresis and no dizziness    Risk factors comment:  Quadrant cuspid aortic valve      None       Past Medical History:   Diagnosis Date    Aortic valve, quadricuspid     Cough variant asthma     last assessed: 2016    Depression     last assessed: 10/22/2012    Palpitations     last assessed: 2014       Past Surgical History:   Procedure Laterality Date    TONSILLECTOMY         Family History   Problem Relation Age of Onset    Depression Mother     Aortic aneurysm Father      I have reviewed and agree with the history as documented  Social History     Tobacco Use    Smoking status: Never Smoker    Smokeless tobacco: Never Used   Substance Use Topics    Alcohol use: Yes     Comment: per [employer]-has 3-4 drinks weekly    Drug use: Never     Comment: no use        Review of Systems   Constitutional: Negative for activity change, appetite change and diaphoresis  HENT: Negative for congestion, dental problem, drooling and ear discharge  Eyes: Negative for pain, discharge and itching  Respiratory: Negative for cough  Cardiovascular: Positive for chest pain  Negative for claudication  Gastrointestinal: Negative for abdominal distention and anorexia  No pulsatile abdominal aorta or aortic bruit   Endocrine: Negative for cold intolerance and heat intolerance  Genitourinary: Negative for difficulty urinating, dysuria and enuresis  Musculoskeletal: Negative for arthralgias, back pain and gait problem  Skin: Negative for color change and pallor  Allergic/Immunologic: Negative for environmental allergies  Neurological: Negative for dizziness and facial asymmetry  Hematological: Negative for adenopathy  Psychiatric/Behavioral: Negative for agitation, behavioral problems and confusion  All other systems reviewed and are negative  Physical Exam  Physical Exam   Constitutional: He appears well-developed and well-nourished  Non-toxic appearance  HENT:   Head: Normocephalic  Eyes: Pupils are equal, round, and reactive to light  Neck: No tracheal deviation present  No thyromegaly present  Cardiovascular: Regular rhythm, intact distal pulses and normal pulses  No extrasystoles are present  PMI is displaced  Murmur heard  Systolic murmur is present with a grade of 2/6   2/6 holosystolic murmur at the left sternal border   Pulmonary/Chest: Effort normal  No accessory muscle usage   No respiratory distress  He has no decreased breath sounds  He has no wheezes  He has no rales  Abdominal: Soft  He exhibits no distension, no ascites and no mass  There is no tenderness  There is no rebound and no guarding  Musculoskeletal:        Right lower leg: He exhibits no tenderness and no edema  Left lower leg: He exhibits no tenderness and no edema  Negative Homans sign bilaterally   Neurological: He is not disoriented  Skin: Capillary refill takes less than 2 seconds  No rash noted  He is not diaphoretic  No erythema  No pallor  Psychiatric: His mood appears anxious  Vitals reviewed        Vital Signs  ED Triage Vitals [03/02/19 0856]   Temperature Pulse Respirations Blood Pressure SpO2   97 9 °F (36 6 °C) 70 16 154/84 100 %      Temp Source Heart Rate Source Patient Position - Orthostatic VS BP Location FiO2 (%)   Oral Monitor Sitting Left arm --      Pain Score       6           Vitals:    03/02/19 0856 03/02/19 1019 03/02/19 1144   BP: 154/84 116/67 121/76   Pulse: 70 71 74   Patient Position - Orthostatic VS: Sitting Sitting Sitting       Visual Acuity      ED Medications  Medications   iohexol (OMNIPAQUE) 350 MG/ML injection (MULTI-DOSE) 100 mL (100 mL Intravenous Given 3/2/19 0927)   aluminum-magnesium hydroxide-simethicone (MYLANTA) 200-200-20 mg/5 mL oral suspension 30 mL (30 mL Oral Given 3/2/19 1020)       Diagnostic Studies  Results Reviewed     Procedure Component Value Units Date/Time    Troponin I [[id number]]  (Normal) Collected:  03/02/19 1146    Lab Status:  Final result Specimen:  Blood from Arm, Left Updated:  03/02/19 1218     Troponin I <0 02 ng/mL     Troponin I [[id number]]  (Normal) Collected:  03/02/19 0902    Lab Status:  Final result Specimen:  Blood from Arm, Left Updated:  03/02/19 0933     Troponin I <0 02 ng/mL     Comprehensive metabolic panel [[id number]] Collected:  03/02/19 0902    Lab Status:  Final result Specimen:  Blood from Arm, Left Updated:  03/02/19 6244     Sodium 141 mmol/L      Potassium 3 8 mmol/L      Chloride 103 mmol/L      CO2 30 mmol/L      ANION GAP 8 mmol/L      BUN 10 mg/dL      Creatinine 0 73 mg/dL      Glucose 95 mg/dL      Calcium 9 4 mg/dL      AST 15 U/L      ALT 24 U/L      Alkaline Phosphatase 63 U/L      Total Protein 7 5 g/dL      Albumin 4 4 g/dL      Total Bilirubin 0 63 mg/dL      eGFR 114 ml/min/1 73sq m     Narrative:       National Kidney Disease Education Program recommendations are as follows:  GFR calculation is accurate only with a steady state creatinine  Chronic Kidney disease less than 60 ml/min/1 73 sq  meters  Kidney failure less than 15 ml/min/1 73 sq  meters      Protime-INR [[id number]]  (Normal) Collected:  03/02/19 0902    Lab Status:  Final result Specimen:  Blood from Arm, Left Updated:  03/02/19 0918     Protime 12 9 seconds      INR 0 96    APTT [[id number]]  (Normal) Collected:  03/02/19 0902    Lab Status:  Final result Specimen:  Blood from Arm, Left Updated:  03/02/19 0918     PTT 30 seconds     POCT Chem 8+ [[id number]]  (Abnormal) Collected:  03/02/19 0909    Lab Status:  Final result Specimen:  Venous Updated:  03/02/19 0914     SODIUM, I-STAT 141 mmol/l      Potassium, i-STAT 3 8 mmol/L      Chloride, istat 101 mmol/L      CO2, i-STAT 29 mmol/L      Anion Gap, i-STAT 16 mmol/L      Calcium, Ionized i-STAT 1 22 mmol/L      BUN, I-STAT 10 mg/dl      Creatinine, i-STAT 0 7 mg/dl      eGFR 116 ml/min/1 73sq m      Glucose, i-STAT 95 mg/dl      Hct, i-STAT 48 %      Hgb, i-STAT 16 3 g/dl      Specimen Type VENOUS    CBC and differential [[id number]] Collected:  03/02/19 0902    Lab Status:  Final result Specimen:  Blood from Arm, Left Updated:  03/02/19 0909     WBC 7 19 Thousand/uL      RBC 5 17 Million/uL      Hemoglobin 15 9 g/dL      Hematocrit 48 6 %      MCV 94 fL      MCH 30 8 pg      MCHC 32 7 g/dL      RDW 12 1 %      MPV 9 1 fL      Platelets 519 Thousands/uL      nRBC 0 /100 WBCs      Neutrophils Relative 62 %      Immat GRANS % 0 %      Lymphocytes Relative 27 %      Monocytes Relative 8 %      Eosinophils Relative 2 %      Basophils Relative 1 %      Neutrophils Absolute 4 51 Thousands/µL      Immature Grans Absolute 0 01 Thousand/uL      Lymphocytes Absolute 1 95 Thousands/µL      Monocytes Absolute 0 54 Thousand/µL      Eosinophils Absolute 0 13 Thousand/µL      Basophils Absolute 0 05 Thousands/µL                  CTA dissection protocol chest abdomen pelvis w wo contrast   Final Result by  (03/02 1236)   Addendum 1 of 1 by Cabrera Ruiz DO (03/02 5419)   ADDENDUM:      Incidental note is made of bilateral pars interarticularis defects of L5  Grade 1 anterolisthesis L5 on S1  Final                 Procedures  Procedures       Phone Contacts  ED Phone Contact    ED Course  ED Course as of Mar 02 1236   Sat Mar 02, 2019   1235 Troponin I         HEART Risk Score      Most Recent Value   History  1 Filed at: 03/02/2019 0904   ECG  1 Filed at: 03/02/2019 4043   Age  0 Filed at: 03/02/2019 4352   Risk Factors  0 Filed at: 03/02/2019 0904   Troponin  0 Filed at: 03/02/2019 0904   Heart Score Risk Calculator   History  1 Filed at: 03/02/2019 0904   ECG  1 Filed at: 03/02/2019 0869   Age  0 Filed at: 03/02/2019 2809   Risk Factors  0 Filed at: 03/02/2019 0904   Troponin  0 Filed at: 03/02/2019 3288   HEART Score  2 Filed at: 03/02/2019 6656   HEART Score  2 Filed at: 03/02/2019 0429                            MDM  Number of Diagnoses or Management Options  Diagnosis management comments: Differential diagnosis 1  Aortic dissection 2  Acute coronary syndrome 3  Pulmonary embolism  Will perform CT scan of the chest abdomen pelvis check labs           Amount and/or Complexity of Data Reviewed  Clinical lab tests: reviewed  Tests in the radiology section of CPT®: reviewed  Tests in the medicine section of CPT®: reviewed    Risk of Complications, Morbidity, and/or Mortality  Presenting problems: high  Diagnostic procedures: high        Disposition  Final diagnoses:   Hiatal hernia   Esophagitis     Time reflects when diagnosis was documented in both MDM as applicable and the Disposition within this note     Time User Action Codes Description Comment    3/2/2019 12:36 PM Kaylie Ulloa Add [K44 9] Hiatal hernia     3/2/2019 12:36 PM Kaylie Ulloa Add [K20 9] Esophagitis       ED Disposition     ED Disposition Condition Date/Time Comment    Discharge Stable Sat Mar 2, 2019 12:35 PM [address] discharge to home/self care  Follow-up Information    None         Patient's Medications   Discharge Prescriptions    FAMOTIDINE (PEPCID) 20 MG TABLET    Take 1 tablet (20 mg total) by mouth 2 (two) times a day       Start Date: 3/2/2019  End Date: --       Order Dose: 20 mg       Quantity: 30 tablet    Refills: 0     No discharge procedures on file      ED Provider  Electronically Signed by           Sigifredo Rojas DO  03/02/19 4942

## 2019-03-02 NOTE — ED PROCEDURE NOTE
PROCEDURE  ECG 12 Lead Documentation  Date/Time: 3/2/2019 9:05 AM  Performed by: Cheryl Warner DO  Authorized by: Cheryl Warner DO     Indications / Diagnosis:  Chest pain   ECG reviewed by me, the ED Provider: yes    Patient location:  ED  Previous ECG:     Previous ECG:  Unavailable (Unchanged when compared to EKG from September 13, 2015  )  Interpretation:     Interpretation: non-specific    Rate:     ECG rate:  77    ECG rate assessment: normal    Rhythm:     Rhythm: sinus rhythm    ST segments:     ST segments:  Non-specific         Cheryl Warner DO  03/02/19 3469

## 2019-06-10 ENCOUNTER — OFFICE VISIT (OUTPATIENT)
Dept: FAMILY MEDICINE CLINIC | Facility: CLINIC | Age: 44
End: 2019-06-10
Payer: COMMERCIAL

## 2019-06-10 VITALS
HEART RATE: 88 BPM | WEIGHT: 143 LBS | SYSTOLIC BLOOD PRESSURE: 124 MMHG | HEIGHT: 71 IN | RESPIRATION RATE: 18 BRPM | DIASTOLIC BLOOD PRESSURE: 76 MMHG | BODY MASS INDEX: 20.02 KG/M2 | OXYGEN SATURATION: 96 %

## 2019-06-10 DIAGNOSIS — Z00.00 ANNUAL PHYSICAL EXAM: Primary | ICD-10-CM

## 2019-06-10 DIAGNOSIS — E78.2 MIXED HYPERLIPIDEMIA: ICD-10-CM

## 2019-06-10 DIAGNOSIS — E55.9 VITAMIN D DEFICIENCY: ICD-10-CM

## 2019-06-10 DIAGNOSIS — Q23.8 QUADRICUSPID AORTIC VALVE: ICD-10-CM

## 2019-06-10 DIAGNOSIS — F41.9 ANXIETY: ICD-10-CM

## 2019-06-10 PROCEDURE — 99396 PREV VISIT EST AGE 40-64: CPT | Performed by: FAMILY MEDICINE

## 2019-06-10 RX ORDER — LORAZEPAM 0.5 MG/1
0.5 TABLET ORAL DAILY PRN
Qty: 30 TABLET | Refills: 1 | Status: SHIPPED | OUTPATIENT
Start: 2019-06-10 | End: 2020-08-14 | Stop reason: SDUPTHER

## 2019-06-10 RX ORDER — LORAZEPAM 0.5 MG/1
0.5 TABLET ORAL DAILY PRN
COMMUNITY
Start: 2013-04-19 | End: 2019-06-10 | Stop reason: SDUPTHER

## 2019-06-10 RX ORDER — UBIDECARENONE 75 MG
1 CAPSULE ORAL EVERY OTHER DAY
COMMUNITY

## 2019-06-10 RX ORDER — FLUTICASONE PROPIONATE 50 MCG
2 SPRAY, SUSPENSION (ML) NASAL DAILY
COMMUNITY

## 2019-06-17 ENCOUNTER — APPOINTMENT (OUTPATIENT)
Dept: LAB | Facility: CLINIC | Age: 44
End: 2019-06-17
Payer: COMMERCIAL

## 2019-06-17 DIAGNOSIS — Q23.8 QUADRICUSPID AORTIC VALVE: ICD-10-CM

## 2019-06-17 DIAGNOSIS — E55.9 VITAMIN D DEFICIENCY: ICD-10-CM

## 2019-06-17 DIAGNOSIS — E78.2 MIXED HYPERLIPIDEMIA: ICD-10-CM

## 2019-06-17 DIAGNOSIS — F41.9 ANXIETY: ICD-10-CM

## 2019-06-17 LAB
25(OH)D3 SERPL-MCNC: 20.4 NG/ML (ref 30–100)
ALBUMIN SERPL BCP-MCNC: 4.1 G/DL (ref 3.5–5)
ALP SERPL-CCNC: 55 U/L (ref 46–116)
ALT SERPL W P-5'-P-CCNC: 20 U/L (ref 12–78)
ANION GAP SERPL CALCULATED.3IONS-SCNC: 4 MMOL/L (ref 4–13)
AST SERPL W P-5'-P-CCNC: 10 U/L (ref 5–45)
BASOPHILS # BLD AUTO: 0.05 THOUSANDS/ΜL (ref 0–0.1)
BASOPHILS NFR BLD AUTO: 1 % (ref 0–1)
BILIRUB SERPL-MCNC: 0.46 MG/DL (ref 0.2–1)
BUN SERPL-MCNC: 13 MG/DL (ref 5–25)
CALCIUM SERPL-MCNC: 8.9 MG/DL (ref 8.3–10.1)
CHLORIDE SERPL-SCNC: 108 MMOL/L (ref 100–108)
CHOLEST SERPL-MCNC: 186 MG/DL (ref 50–200)
CO2 SERPL-SCNC: 29 MMOL/L (ref 21–32)
CREAT SERPL-MCNC: 0.74 MG/DL (ref 0.6–1.3)
EOSINOPHIL # BLD AUTO: 0.12 THOUSAND/ΜL (ref 0–0.61)
EOSINOPHIL NFR BLD AUTO: 2 % (ref 0–6)
ERYTHROCYTE [DISTWIDTH] IN BLOOD BY AUTOMATED COUNT: 12.5 % (ref 11.6–15.1)
GFR SERPL CREATININE-BSD FRML MDRD: 113 ML/MIN/1.73SQ M
GLUCOSE P FAST SERPL-MCNC: 83 MG/DL (ref 65–99)
HCT VFR BLD AUTO: 45.6 % (ref 36.5–49.3)
HDLC SERPL-MCNC: 77 MG/DL (ref 40–60)
HGB BLD-MCNC: 14.8 G/DL (ref 12–17)
IMM GRANULOCYTES # BLD AUTO: 0.02 THOUSAND/UL (ref 0–0.2)
IMM GRANULOCYTES NFR BLD AUTO: 0 % (ref 0–2)
LDLC SERPL CALC-MCNC: 92 MG/DL (ref 0–100)
LYMPHOCYTES # BLD AUTO: 1.61 THOUSANDS/ΜL (ref 0.6–4.47)
LYMPHOCYTES NFR BLD AUTO: 22 % (ref 14–44)
MCH RBC QN AUTO: 30.1 PG (ref 26.8–34.3)
MCHC RBC AUTO-ENTMCNC: 32.5 G/DL (ref 31.4–37.4)
MCV RBC AUTO: 93 FL (ref 82–98)
MONOCYTES # BLD AUTO: 0.49 THOUSAND/ΜL (ref 0.17–1.22)
MONOCYTES NFR BLD AUTO: 7 % (ref 4–12)
NEUTROPHILS # BLD AUTO: 4.92 THOUSANDS/ΜL (ref 1.85–7.62)
NEUTS SEG NFR BLD AUTO: 68 % (ref 43–75)
NRBC BLD AUTO-RTO: 0 /100 WBCS
PLATELET # BLD AUTO: 246 THOUSANDS/UL (ref 149–390)
PMV BLD AUTO: 9.7 FL (ref 8.9–12.7)
POTASSIUM SERPL-SCNC: 4.1 MMOL/L (ref 3.5–5.3)
PROT SERPL-MCNC: 6.5 G/DL (ref 6.4–8.2)
RBC # BLD AUTO: 4.91 MILLION/UL (ref 3.88–5.62)
SODIUM SERPL-SCNC: 141 MMOL/L (ref 136–145)
TRIGL SERPL-MCNC: 83 MG/DL
WBC # BLD AUTO: 7.21 THOUSAND/UL (ref 4.31–10.16)

## 2019-06-17 PROCEDURE — 82306 VITAMIN D 25 HYDROXY: CPT

## 2019-06-17 PROCEDURE — 85025 COMPLETE CBC W/AUTO DIFF WBC: CPT

## 2019-06-17 PROCEDURE — 80061 LIPID PANEL: CPT

## 2019-06-17 PROCEDURE — 80053 COMPREHEN METABOLIC PANEL: CPT

## 2019-06-17 PROCEDURE — 36415 COLL VENOUS BLD VENIPUNCTURE: CPT

## 2019-10-16 ENCOUNTER — CLINICAL SUPPORT (OUTPATIENT)
Dept: FAMILY MEDICINE CLINIC | Facility: CLINIC | Age: 44
End: 2019-10-16
Payer: COMMERCIAL

## 2019-10-16 DIAGNOSIS — Z11.1 ENCOUNTER FOR PPD TEST: Primary | ICD-10-CM

## 2019-10-16 PROCEDURE — 86580 TB INTRADERMAL TEST: CPT

## 2019-10-18 ENCOUNTER — CLINICAL SUPPORT (OUTPATIENT)
Dept: FAMILY MEDICINE CLINIC | Facility: CLINIC | Age: 44
End: 2019-10-18

## 2019-10-18 DIAGNOSIS — Z11.1 ENCOUNTER FOR PPD SKIN TEST READING: Primary | ICD-10-CM

## 2019-10-18 LAB
INDURATION: NEGATIVE MM
TB SKIN TEST: NEGATIVE

## 2020-08-06 ENCOUNTER — TELEMEDICINE (OUTPATIENT)
Dept: FAMILY MEDICINE CLINIC | Facility: CLINIC | Age: 45
End: 2020-08-06
Payer: COMMERCIAL

## 2020-08-06 VITALS — WEIGHT: 145 LBS | BODY MASS INDEX: 20.3 KG/M2 | HEIGHT: 71 IN | TEMPERATURE: 103.2 F

## 2020-08-06 DIAGNOSIS — B34.9 VIRAL ILLNESS: ICD-10-CM

## 2020-08-06 DIAGNOSIS — Z20.822 EXPOSURE TO COVID-19 VIRUS: Primary | ICD-10-CM

## 2020-08-06 DIAGNOSIS — Z20.822 EXPOSURE TO COVID-19 VIRUS: ICD-10-CM

## 2020-08-06 PROCEDURE — U0003 INFECTIOUS AGENT DETECTION BY NUCLEIC ACID (DNA OR RNA); SEVERE ACUTE RESPIRATORY SYNDROME CORONAVIRUS 2 (SARS-COV-2) (CORONAVIRUS DISEASE [COVID-19]), AMPLIFIED PROBE TECHNIQUE, MAKING USE OF HIGH THROUGHPUT TECHNOLOGIES AS DESCRIBED BY CMS-2020-01-R: HCPCS | Performed by: FAMILY MEDICINE

## 2020-08-06 PROCEDURE — 99213 OFFICE O/P EST LOW 20 MIN: CPT | Performed by: FAMILY MEDICINE

## 2020-08-06 PROCEDURE — 3008F BODY MASS INDEX DOCD: CPT | Performed by: FAMILY MEDICINE

## 2020-08-06 PROCEDURE — 1036F TOBACCO NON-USER: CPT | Performed by: FAMILY MEDICINE

## 2020-08-06 NOTE — PROGRESS NOTES
Virtual Regular Visit      Assessment/Plan:    Problem List Items Addressed This Visit     None        I have ordered him COVID-19 PCR testing at the mobile [facility]  Recommended Tylenol alternating with ibuprofen every 3 hours for fever and increased hydration with water  Discussed need for strict quarantine and mask use  Reason for visit is   Chief Complaint   Patient presents with    COVID-19     headache, chills, bodyaches, loose stools, some SOB, sweating         Encounter provider Angelina Quiros MD    Provider located at [address] [place] [address] [address] [place] [phone]      Recent Visits  No visits were found meeting these conditions  Showing recent visits within past 7 days and meeting all other requirements     Today's Visits  Date Type Provider Dept   08/06/20 Sveta Grimaldo MD [place] [id number] [facility]   Showing today's visits and meeting all other requirements     Future Appointments  No visits were found meeting these conditions  Showing future appointments within next 150 days and meeting all other requirements        The patient was identified by name and date of birth  Lori Calderon was informed that this is a telemedicine visit and that the visit is being conducted through [place]  My office door was closed  No one else was in the room  He acknowledged consent and understanding of privacy and security of the video platform  The patient has agreed to participate and understands they can discontinue the visit at any time  Patient is aware this is a billable service  Subjective  Lori Calderon is a 41 y o  male    He c/o viral sx which started 2 days ago  He c/o HA, bodyaches  High fever and chills started today  No known viral exposure  He has been mainly been at home with his   Travel to [place] last week with family         Past Medical History:   Diagnosis Date    Aortic valve, quadricuspid     Cough variant asthma     last assessed: 8/14/2016    Depression     last assessed: 10/22/2012    Palpitations     last assessed: 5/19/2014       Past Surgical History:   Procedure Laterality Date    TONSILLECTOMY         Current Outpatient Medications   Medication Sig Dispense Refill    cyanocobalamin (VITAMIN B-12) 100 mcg tablet Take 1 tablet by mouth every other day      fluticasone (FLONASE) 50 mcg/act nasal spray 2 sprays daily      LORazepam (ATIVAN) 0 5 mg tablet Take 1 tablet (0 5 mg total) by mouth daily as needed for anxiety 30 tablet 1     No current facility-administered medications for this visit  Allergies   Allergen Reactions    Dairy Aid [Lactase]     Eggs Or Egg-Derived Products     Iron      Category: VOMITING;     Meat Extract        Review of Systems   Constitutional: Positive for chills, fatigue and fever  Negative for activity change  HENT: Negative for sore throat  Respiratory: Positive for cough  Negative for shortness of breath and wheezing  Gastrointestinal: Positive for nausea  Negative for abdominal pain, diarrhea and vomiting  Neurological: Positive for headaches  Negative for dizziness  Video Exam    Vitals:    08/06/20 1526   Temp: (!) 103 2 °F (39 6 °C)   TempSrc: Oral   Weight: 65 8 kg (145 lb)   Height: 5' 11" (1 803 m)       Physical Exam   Constitutional: He is oriented to person, place, and time  He appears ill  Pulmonary/Chest: No respiratory distress  Neurological: He is alert and oriented to person, place, and time  Psychiatric: His behavior is normal  Mood normal    Nursing note and vitals reviewed  I spent 14 minutes directly with the patient during this visit      [place] acknowledges that he has consented to an online visit or consultation   He understands that the online visit is based solely on information provided by him, and that, in the absence of a face-to-face physical evaluation by the physician, the diagnosis he receives is both limited and provisional in terms of accuracy and completeness  This is not intended to replace a full medical face-to-face evaluation by the physician  Tracy Valverde understands and accepts these terms

## 2020-08-07 LAB — SARS-COV-2 RNA SPEC QL NAA+PROBE: NOT DETECTED

## 2020-08-10 ENCOUNTER — APPOINTMENT (OUTPATIENT)
Dept: LAB | Facility: MEDICAL CENTER | Age: 45
End: 2020-08-10
Payer: COMMERCIAL

## 2020-08-10 ENCOUNTER — APPOINTMENT (OUTPATIENT)
Dept: RADIOLOGY | Facility: MEDICAL CENTER | Age: 45
End: 2020-08-10
Payer: COMMERCIAL

## 2020-08-10 ENCOUNTER — OFFICE VISIT (OUTPATIENT)
Dept: FAMILY MEDICINE CLINIC | Facility: CLINIC | Age: 45
End: 2020-08-10
Payer: COMMERCIAL

## 2020-08-10 DIAGNOSIS — R50.9 FEVER, UNSPECIFIED FEVER CAUSE: ICD-10-CM

## 2020-08-10 DIAGNOSIS — R50.9 FEVER, UNSPECIFIED FEVER CAUSE: Primary | ICD-10-CM

## 2020-08-10 DIAGNOSIS — R05.9 COUGH: ICD-10-CM

## 2020-08-10 DIAGNOSIS — R19.7 DIARRHEA OF PRESUMED INFECTIOUS ORIGIN: ICD-10-CM

## 2020-08-10 LAB
ALBUMIN SERPL BCP-MCNC: 3.6 G/DL (ref 3.5–5)
ALP SERPL-CCNC: 60 U/L (ref 46–116)
ALT SERPL W P-5'-P-CCNC: 22 U/L (ref 12–78)
ANION GAP SERPL CALCULATED.3IONS-SCNC: 7 MMOL/L (ref 4–13)
AST SERPL W P-5'-P-CCNC: 14 U/L (ref 5–45)
BASOPHILS # BLD AUTO: 0.04 THOUSANDS/ΜL (ref 0–0.1)
BASOPHILS NFR BLD AUTO: 1 % (ref 0–1)
BILIRUB SERPL-MCNC: 0.45 MG/DL (ref 0.2–1)
BILIRUB UR QL STRIP: NEGATIVE
BUN SERPL-MCNC: 7 MG/DL (ref 5–25)
CALCIUM SERPL-MCNC: 9.4 MG/DL (ref 8.3–10.1)
CHLORIDE SERPL-SCNC: 101 MMOL/L (ref 100–108)
CLARITY UR: CLEAR
CO2 SERPL-SCNC: 29 MMOL/L (ref 21–32)
COLOR UR: YELLOW
CREAT SERPL-MCNC: 0.7 MG/DL (ref 0.6–1.3)
CRP SERPL QL: 73.7 MG/L
EOSINOPHIL # BLD AUTO: 0.04 THOUSAND/ΜL (ref 0–0.61)
EOSINOPHIL NFR BLD AUTO: 1 % (ref 0–6)
ERYTHROCYTE [DISTWIDTH] IN BLOOD BY AUTOMATED COUNT: 12.3 % (ref 11.6–15.1)
GFR SERPL CREATININE-BSD FRML MDRD: 115 ML/MIN/1.73SQ M
GLUCOSE P FAST SERPL-MCNC: 79 MG/DL (ref 65–99)
GLUCOSE UR STRIP-MCNC: NEGATIVE MG/DL
HCT VFR BLD AUTO: 47.2 % (ref 36.5–49.3)
HGB BLD-MCNC: 15.2 G/DL (ref 12–17)
HGB UR QL STRIP.AUTO: NEGATIVE
IMM GRANULOCYTES # BLD AUTO: 0.01 THOUSAND/UL (ref 0–0.2)
IMM GRANULOCYTES NFR BLD AUTO: 0 % (ref 0–2)
KETONES UR STRIP-MCNC: ABNORMAL MG/DL
LEUKOCYTE ESTERASE UR QL STRIP: NEGATIVE
LYMPHOCYTES # BLD AUTO: 1.06 THOUSANDS/ΜL (ref 0.6–4.47)
LYMPHOCYTES NFR BLD AUTO: 24 % (ref 14–44)
MCH RBC QN AUTO: 30 PG (ref 26.8–34.3)
MCHC RBC AUTO-ENTMCNC: 32.2 G/DL (ref 31.4–37.4)
MCV RBC AUTO: 93 FL (ref 82–98)
MONOCYTES # BLD AUTO: 0.62 THOUSAND/ΜL (ref 0.17–1.22)
MONOCYTES NFR BLD AUTO: 14 % (ref 4–12)
NEUTROPHILS # BLD AUTO: 2.62 THOUSANDS/ΜL (ref 1.85–7.62)
NEUTS SEG NFR BLD AUTO: 60 % (ref 43–75)
NITRITE UR QL STRIP: NEGATIVE
NRBC BLD AUTO-RTO: 0 /100 WBCS
PH UR STRIP.AUTO: 7.5 [PH]
PLATELET # BLD AUTO: 241 THOUSANDS/UL (ref 149–390)
PMV BLD AUTO: 9.9 FL (ref 8.9–12.7)
POTASSIUM SERPL-SCNC: 3.9 MMOL/L (ref 3.5–5.3)
PROT SERPL-MCNC: 7.2 G/DL (ref 6.4–8.2)
PROT UR STRIP-MCNC: NEGATIVE MG/DL
RBC # BLD AUTO: 5.07 MILLION/UL (ref 3.88–5.62)
SODIUM SERPL-SCNC: 137 MMOL/L (ref 136–145)
SP GR UR STRIP.AUTO: 1.01 (ref 1–1.03)
UROBILINOGEN UR QL STRIP.AUTO: 0.2 E.U./DL
WBC # BLD AUTO: 4.39 THOUSAND/UL (ref 4.31–10.16)

## 2020-08-10 PROCEDURE — 1036F TOBACCO NON-USER: CPT | Performed by: FAMILY MEDICINE

## 2020-08-10 PROCEDURE — 86140 C-REACTIVE PROTEIN: CPT

## 2020-08-10 PROCEDURE — 71046 X-RAY EXAM CHEST 2 VIEWS: CPT

## 2020-08-10 PROCEDURE — 87505 NFCT AGENT DETECTION GI: CPT

## 2020-08-10 PROCEDURE — 81003 URINALYSIS AUTO W/O SCOPE: CPT

## 2020-08-10 PROCEDURE — 85025 COMPLETE CBC W/AUTO DIFF WBC: CPT

## 2020-08-10 PROCEDURE — 80053 COMPREHEN METABOLIC PANEL: CPT

## 2020-08-10 PROCEDURE — 99214 OFFICE O/P EST MOD 30 MIN: CPT | Performed by: FAMILY MEDICINE

## 2020-08-10 PROCEDURE — 36415 COLL VENOUS BLD VENIPUNCTURE: CPT

## 2020-08-10 RX ORDER — IBUPROFEN 200 MG
200 TABLET ORAL EVERY 6 HOURS PRN
COMMUNITY
End: 2021-09-01 | Stop reason: ALTCHOICE

## 2020-08-10 NOTE — PROGRESS NOTES
Virtual Regular Visit      Assessment/Plan:    Problem List Items Addressed This Visit     None      Visit Diagnoses     Fever, unspecified fever cause    -  Primary    Relevant Orders    CBC and differential    Comprehensive metabolic panel    XR chest pa & lateral    C-reactive protein    UA (URINE) with reflex to Scope    Stool culture    Diarrhea of presumed infectious origin        Relevant Orders    Stool culture    Cough        Relevant Orders    XR chest pa & lateral        Check chest x-ray and labs as outlined  Does have a Quattro cuspid aortic valve and questioned possible workup for endocarditis  I would certainly consider getting a stat echo  Check chest x-ray and labs initially  The patient agrees to contact me immediately with any worsening condition  Reason for visit is   Chief Complaint   Patient presents with    COVID-19     Symptoms, Covid screen Negative 8/7/20    Cough     New symtoms     Virtual Regular Visit        Encounter provider Josephine Wilde MD    Provider located at [address] [place] [address] [address] [place] [phone]      Recent Visits  Date Type Provider Dept   08/06/20 Michael Sofia MD [place] [id number] Primary Care   Showing recent visits within past 7 days and meeting all other requirements     Today's Visits  Date Type Provider Dept   08/10/20 Office Visit Josephine Wilde MD [facility] Primary Care   Showing today's visits and meeting all other requirements     Future Appointments  No visits were found meeting these conditions  Showing future appointments within next 150 days and meeting all other requirements        The patient was identified by name and date of birth  Lilian Jiang was informed that this is a telemedicine visit and that the visit is being conducted through [employer]  My office door was closed  No one else was in the room    He acknowledged consent and understanding of privacy and security of the video platform  The patient has agreed to participate and understands they can discontinue the visit at any time  Patient is aware this is a billable service  Subjective  Arnie Avila is a 41 y o  male   HPI patient seen today for persistent fever  He notes over the past 6 days he has had fevers ranging up to 103  He has felt very fatigued with poor appetite  He has had some intermittent severe diarrhea  His diarrhea over the past 48 hours has turned quite yellow  He is also having a intermittent dry cough and some mild shortness of breath  He denies anosmia or changes in taste  He has no known COVID exposures and recently tested negative for COVID  He does feel quite fatigued  He feels as though his diffuse body aches and headache have improved significantly over the past 48 hours  He is able to drink fluids but notes a very poor appetite  Past Medical History:   Diagnosis Date    Aortic valve, quadricuspid     Cough variant asthma     last assessed: 8/14/2016    Depression     last assessed: 10/22/2012    Palpitations     last assessed: 5/19/2014       Past Surgical History:   Procedure Laterality Date    TONSILLECTOMY         Current Outpatient Medications   Medication Sig Dispense Refill    fluticasone (FLONASE) 50 mcg/act nasal spray 2 sprays daily      ibuprofen (MOTRIN) 200 mg tablet Take 200 mg by mouth every 6 (six) hours as needed for mild pain      LORazepam (ATIVAN) 0 5 mg tablet Take 1 tablet (0 5 mg total) by mouth daily as needed for anxiety 30 tablet 1    cyanocobalamin (VITAMIN B-12) 100 mcg tablet Take 1 tablet by mouth every other day       No current facility-administered medications for this visit           Allergies   Allergen Reactions    Dairy Aid [Lactase]     Eggs Or Egg-Derived Products     Iron      Category: VOMITING;     Meat Extract        Review of Systems    Video Exam    There were no vitals filed for this visit  Physical Exam     I spent 20 minutes directly with the patient during this visit      [place] acknowledges that he has consented to an online visit or consultation  He understands that the online visit is based solely on information provided by him, and that, in the absence of a face-to-face physical evaluation by the physician, the diagnosis he receives is both limited and provisional in terms of accuracy and completeness  This is not intended to replace a full medical face-to-face evaluation by the physician  Keo Hadley understands and accepts these terms

## 2020-08-11 DIAGNOSIS — A04.5 CAMPYLOBACTER DIARRHEA: Primary | ICD-10-CM

## 2020-08-11 LAB
CAMPYLOBACTER DNA SPEC NAA+PROBE: DETECTED
SALMONELLA DNA SPEC QL NAA+PROBE: ABNORMAL
SHIGA TOXIN STX GENE SPEC NAA+PROBE: ABNORMAL
SHIGELLA DNA SPEC QL NAA+PROBE: ABNORMAL

## 2020-08-11 RX ORDER — AZITHROMYCIN 500 MG/1
500 TABLET, FILM COATED ORAL DAILY
Qty: 6 TABLET | Refills: 0 | Status: SHIPPED | OUTPATIENT
Start: 2020-08-11 | End: 2020-08-17

## 2020-08-11 NOTE — ASSESSMENT & PLAN NOTE
Treat with a Zithromax and 500 mg x 3 days at least   Continue past 3 days if he is having persistent symptoms

## 2020-08-11 NOTE — PROGRESS NOTES
Campylobacter diarrhea  Treat with a Zithromax and 500 mg x 3 days at least   Continue past 3 days if he is having persistent symptoms

## 2020-08-12 ENCOUNTER — TELEPHONE (OUTPATIENT)
Dept: FAMILY MEDICINE CLINIC | Facility: CLINIC | Age: 45
End: 2020-08-12

## 2020-08-12 NOTE — TELEPHONE ENCOUNTER
Spoke with patient today and informed about results, patient informed about medication send to the pharmacy and virtual visit was schedule for Friday

## 2020-08-12 NOTE — TELEPHONE ENCOUNTER
----- Message from Rylee Sanchez MD sent at 8/11/2020  9:47 AM EDT -----  Your stool culture reveals Campylobacter  I am going to treat you with a Zithromax in 500 mg daily  Typically, 3 days is enough, but I will prescribe 6 days worth in case you are still feeling ill after completing 3 days  Hopefully, this is the source of all of the issues  Let us have a follow-up discussion later this week  I can have my staff arrange something for Thursday or Friday  Please contact us immediately if you are feeling any worse  Staff, please set up a video visit with myself and Mr Christine Crockett for later this week

## 2020-08-14 ENCOUNTER — TELEMEDICINE (OUTPATIENT)
Dept: FAMILY MEDICINE CLINIC | Facility: CLINIC | Age: 45
End: 2020-08-14
Payer: COMMERCIAL

## 2020-08-14 DIAGNOSIS — A04.5 CAMPYLOBACTER DIARRHEA: Primary | ICD-10-CM

## 2020-08-14 DIAGNOSIS — F41.9 ANXIETY: ICD-10-CM

## 2020-08-14 DIAGNOSIS — Q23.8 QUADRICUSPID AORTIC VALVE: ICD-10-CM

## 2020-08-14 PROCEDURE — 99213 OFFICE O/P EST LOW 20 MIN: CPT | Performed by: FAMILY MEDICINE

## 2020-08-14 PROCEDURE — 3725F SCREEN DEPRESSION PERFORMED: CPT | Performed by: FAMILY MEDICINE

## 2020-08-14 PROCEDURE — 1036F TOBACCO NON-USER: CPT | Performed by: FAMILY MEDICINE

## 2020-08-14 RX ORDER — LORAZEPAM 0.5 MG/1
0.5 TABLET ORAL DAILY PRN
Qty: 30 TABLET | Refills: 1 | Status: SHIPPED | OUTPATIENT
Start: 2020-08-14 | End: 2021-09-01 | Stop reason: SDUPTHER

## 2020-08-14 NOTE — PROGRESS NOTES
Virtual Regular Visit      Assessment/Plan:    Problem List Items Addressed This Visit        Digestive    Campylobacter diarrhea - Primary     Continue azithromycin  He is improving significantly  He agrees to contact me immediately if symptoms are getting any worse  Cardiovascular and Mediastinum    Quadricuspid aortic valve     Continue routine cardiology evaluation  Other Visit Diagnoses     Anxiety        Relevant Medications    LORazepam (ATIVAN) 0 5 mg tablet               Reason for visit is   Chief Complaint   Patient presents with    Virtual Regular Visit     Labs review    Virtual Regular Visit        Encounter provider Anniece Skiff, MD    Provider located at [address] [place] [address] [address] [place] [phone]      Recent Visits  Date Type Provider Dept   08/12/20 Telephone Ksenia Krishnamurthy [facility], [address] [place] [address] Primary Care   08/10/20 Office Visit Anniece Skiff , MD [facility]   Showing recent visits within past 7 days and meeting all other requirements     Today's Visits  Date Type Provider Dept   08/14/20 Telemedicine Anniece Skiff , MD [facility] Primary [facility]   Showing today's visits and meeting all other requirements     Future Appointments  No visits were found meeting these conditions  Showing future appointments within next 150 days and meeting all other requirements        The patient was identified by name and date of birth  Erasmo Villalobos was informed that this is a telemedicine visit and that the visit is being conducted through [address]  My office door was closed  No one else was in the room  He acknowledged consent and understanding of privacy and security of the video platform  The patient has agreed to participate and understands they can discontinue the visit at any time  Patient is aware this is a billable service       Subjective  Lawayne Crimes Jian Bowers is a 41 y o  male  HPI the patient was seen today via video visit for a follow-up for his fevers and diarrhea  Recent stool testing revealed Campylobacter  He is improving significantly antibiotics  He denies significant abdominal cramping, nausea or vomiting at this time  He denies any further fever or chills  He does have some persistent fatigue  He has had some weight loss but this seems to be stabilizing  He denies any cough or wheeze at this time  He is having no shortness of breath  Past Medical History:   Diagnosis Date    Aortic valve, quadricuspid     Cough variant asthma     last assessed: 8/14/2016    Depression     last assessed: 10/22/2012    Palpitations     last assessed: 5/19/2014       Past Surgical History:   Procedure Laterality Date    TONSILLECTOMY         Current Outpatient Medications   Medication Sig Dispense Refill    azithromycin (ZITHROMAX) 500 MG tablet Take 1 tablet (500 mg total) by mouth daily for 6 days 6 tablet 0    cyanocobalamin (VITAMIN B-12) 100 mcg tablet Take 1 tablet by mouth every other day      fluticasone (FLONASE) 50 mcg/act nasal spray 2 sprays daily      ibuprofen (MOTRIN) 200 mg tablet Take 200 mg by mouth every 6 (six) hours as needed for mild pain      LORazepam (ATIVAN) 0 5 mg tablet Take 1 tablet (0 5 mg total) by mouth daily as needed for anxiety 30 tablet 1     No current facility-administered medications for this visit  Allergies   Allergen Reactions    Dairy Aid [Lactase]     Eggs Or Egg-Derived Products     Iron      Category: VOMITING;     Meat Extract        Review of Systems   Constitutional: Positive for fatigue  Negative for fever  HENT: Negative for trouble swallowing  Respiratory: Negative for chest tightness, shortness of breath and wheezing  Cardiovascular: Negative for chest pain, palpitations and leg swelling  Gastrointestinal: Positive for diarrhea (improving)   Negative for abdominal pain, anal bleeding, blood in stool, constipation, nausea and vomiting  Endocrine: Negative for polyuria  Genitourinary: Negative for difficulty urinating and flank pain  Musculoskeletal: Positive for myalgias (Much improved  )  Negative for arthralgias  Skin: Negative for rash  Neurological: Negative for weakness and headaches  Psychiatric/Behavioral: Negative for sleep disturbance  Video Exam    There were no vitals filed for this visit  Physical Exam  Constitutional:       Appearance: He is well-developed  HENT:      Head: Normocephalic  Eyes:      Pupils: Pupils are equal, round, and reactive to light  Neck:      Musculoskeletal: Normal range of motion  Pulmonary:      Effort: Pulmonary effort is normal    Skin:     Findings: No rash  Neurological:      Mental Status: He is alert  I spent 15 minutes directly with the patient during this visit      [place] acknowledges that he has consented to an online visit or consultation  He understands that the online visit is based solely on information provided by him, and that, in the absence of a face-to-face physical evaluation by the physician, the diagnosis he receives is both limited and provisional in terms of accuracy and completeness  This is not intended to replace a full medical face-to-face evaluation by the physician  Tamar Higgins understands and accepts these terms

## 2020-08-14 NOTE — ASSESSMENT & PLAN NOTE
Continue azithromycin  He is improving significantly  He agrees to contact me immediately if symptoms are getting any worse

## 2020-08-17 VITALS — HEART RATE: 98 BPM

## 2021-03-31 DIAGNOSIS — Z23 ENCOUNTER FOR IMMUNIZATION: ICD-10-CM

## 2021-04-29 DIAGNOSIS — Q23.8 QUADRICUSPID AORTIC VALVE: Primary | ICD-10-CM

## 2021-04-29 RX ORDER — AMOXICILLIN 500 MG/1
CAPSULE ORAL
Qty: 12 CAPSULE | Refills: 1 | Status: SHIPPED | OUTPATIENT
Start: 2021-04-29 | End: 2022-05-13 | Stop reason: SDUPTHER

## 2021-04-29 NOTE — PROGRESS NOTES
Patient requested prophylactic antibiotics prior to his upcoming dental procedure  His cardiologist at [facility] has noted this is not specifically necessary, however noted that the patient can continue to do that if he feels more comfortable    I will send in amoxicillin at the patient's request

## 2021-09-01 ENCOUNTER — OFFICE VISIT (OUTPATIENT)
Dept: FAMILY MEDICINE CLINIC | Facility: CLINIC | Age: 46
End: 2021-09-01
Payer: COMMERCIAL

## 2021-09-01 VITALS
OXYGEN SATURATION: 98 % | WEIGHT: 148 LBS | TEMPERATURE: 97.6 F | DIASTOLIC BLOOD PRESSURE: 72 MMHG | BODY MASS INDEX: 20.72 KG/M2 | HEIGHT: 71 IN | RESPIRATION RATE: 18 BRPM | HEART RATE: 87 BPM | SYSTOLIC BLOOD PRESSURE: 120 MMHG

## 2021-09-01 DIAGNOSIS — E55.9 VITAMIN D DEFICIENCY: ICD-10-CM

## 2021-09-01 DIAGNOSIS — Z00.00 ANNUAL PHYSICAL EXAM: Primary | ICD-10-CM

## 2021-09-01 DIAGNOSIS — E78.2 MIXED HYPERLIPIDEMIA: ICD-10-CM

## 2021-09-01 DIAGNOSIS — F41.9 ANXIETY DISORDER, UNSPECIFIED TYPE: ICD-10-CM

## 2021-09-01 DIAGNOSIS — R79.82 CRP ELEVATED: ICD-10-CM

## 2021-09-01 DIAGNOSIS — J30.2 SEASONAL ALLERGIES: ICD-10-CM

## 2021-09-01 DIAGNOSIS — K44.9 HIATAL HERNIA: ICD-10-CM

## 2021-09-01 DIAGNOSIS — F41.9 ANXIETY: ICD-10-CM

## 2021-09-01 DIAGNOSIS — Q23.1 CONGENITAL INSUFFICIENCY OF AORTIC VALVE: ICD-10-CM

## 2021-09-01 DIAGNOSIS — M43.06 LUMBAR SPONDYLOLYSIS: ICD-10-CM

## 2021-09-01 DIAGNOSIS — Q23.8 QUADRICUSPID AORTIC VALVE: ICD-10-CM

## 2021-09-01 PROBLEM — A04.5 CAMPYLOBACTER DIARRHEA: Status: RESOLVED | Noted: 2020-08-11 | Resolved: 2021-09-01

## 2021-09-01 PROCEDURE — 3008F BODY MASS INDEX DOCD: CPT | Performed by: FAMILY MEDICINE

## 2021-09-01 PROCEDURE — 99396 PREV VISIT EST AGE 40-64: CPT | Performed by: FAMILY MEDICINE

## 2021-09-01 PROCEDURE — 1036F TOBACCO NON-USER: CPT | Performed by: FAMILY MEDICINE

## 2021-09-01 PROCEDURE — 3725F SCREEN DEPRESSION PERFORMED: CPT | Performed by: FAMILY MEDICINE

## 2021-09-01 RX ORDER — LORAZEPAM 0.5 MG/1
0.5 TABLET ORAL DAILY PRN
Qty: 30 TABLET | Refills: 1 | Status: SHIPPED | OUTPATIENT
Start: 2021-09-01

## 2021-09-01 NOTE — ASSESSMENT & PLAN NOTE
Patient is having no no symptoms consistent with a hiatal hernia  This was discovered incidentally on CT scan    We will continue to follow clinically

## 2021-09-01 NOTE — PATIENT INSTRUCTIONS

## 2021-09-01 NOTE — PROGRESS NOTES
ADULT ANNUAL [facility] PRIMARY CARE    NAME: Mary Irizarry  AGE: 40 y o  SEX: male  : 1975     DATE: 2021     Assessment and Plan:     Problem List Items Addressed This Visit        Cardiovascular and Mediastinum    Congenital insufficiency of aortic valve    Quadricuspid aortic valve       Musculoskeletal and Integument    Lumbar spondylolysis       Other    Anxiety disorder    Relevant Medications    LORazepam (ATIVAN) 0 5 mg tablet    Seasonal allergies    Vitamin D deficiency    Relevant Orders    Vitamin D 25 hydroxy    Mixed hyperlipidemia    Relevant Orders    Lipid Panel with Direct LDL reflex    Hiatal hernia     Patient is having no no symptoms consistent with a hiatal hernia  This was discovered incidentally on CT scan  We will continue to follow clinically         CRP elevated    Relevant Orders    C-reactive protein      Other Visit Diagnoses     Need for tetanus booster    -  Primary    Annual physical exam        Relevant Orders    Ambulatory referral to Dermatology    Anxiety        Relevant Medications    LORazepam (ATIVAN) 0 5 mg tablet    Other Relevant Orders    Comprehensive metabolic panel    CBC and differential    TSH, 3rd generation with Free T4 reflex      Patient presents for annual physical   Overall, he is doing extremely well  He would like to hold off on colonoscopy at this time as he would be on early in for screening  He has no family history and is having no symptoms  He is up-to-date on vaccinations  It is bit early to give the influenza shot so we are going to hold off  He will have fasting labs as outlined  Immunizations and preventive care screenings were discussed with patient today  Appropriate education was printed on patient's after visit summary  Counseling:  Dental Health: discussed importance of regular tooth brushing, flossing, and dental visits    · Exercise: the importance of regular exercise/physical activity was discussed  Recommend exercise 3-5 times per week for at least 30 minutes  BMI Counseling: Body mass index is 20 64 kg/m²  The BMI is above normal  Nutrition recommendations include encouraging healthy choices of fruits and vegetables  Exercise recommendations include moderate physical activity 150 minutes/week  Return in 1 year (on 9/1/2022)  Chief Complaint:     Chief Complaint   Patient presents with    Annual Exam      History of Present Illness:     Adult Annual Physical   Patient here for a comprehensive physical exam  The patient reports that he is doing well overall  He remains in excellent shape  He is followed down at [facility] for his history of a quadrant cuspid aortic valve with regurgitation  Fortunately his echo this year seemed improved from last year  He does have history of allergies and remains on Flonase  Diet and Physical Activity  · Diet/Nutrition: well balanced diet  · Exercise: vigorous cardiovascular exercise  Depression Screening  PHQ-9 Depression Screening    PHQ-9:   Frequency of the following problems over the past two weeks:      Little interest or pleasure in doing things: 0 - not at all  Feeling down, depressed, or hopeless: 0 - not at all  PHQ-2 Score: 0       General Health  · Sleep: sleeps well  · Hearing: normal - bilateral   · Vision: goes for regular eye exams  · Dental: regular dental visits   Health  · Symptoms include: urinary frequency     Review of Systems:     Review of Systems   Constitutional: Negative for appetite change, chills, fatigue, fever and unexpected weight change  HENT: Negative for trouble swallowing  Eyes: Negative for visual disturbance  Respiratory: Negative for cough, chest tightness, shortness of breath and wheezing  Cardiovascular: Positive for palpitations  Negative for chest pain and leg swelling     Gastrointestinal: Negative for abdominal distention, abdominal pain, blood in stool, constipation and diarrhea  Endocrine: Negative for polyuria  Genitourinary: Negative for difficulty urinating and flank pain  Musculoskeletal: Negative for arthralgias and myalgias  Skin: Negative for rash  Neurological: Negative for dizziness and light-headedness  Hematological: Negative for adenopathy  Does not bruise/bleed easily  Psychiatric/Behavioral: Negative for dysphoric mood and sleep disturbance  The patient is not nervous/anxious  Past Medical History:     Past Medical History:   Diagnosis Date    Aortic valve, quadricuspid     Campylobacter diarrhea 8/11/2020    Cough variant asthma     last assessed: 8/14/2016    Depression     last assessed: 10/22/2012    Palpitations     last assessed: 5/19/2014      Past Surgical History:     Past Surgical History:   Procedure Laterality Date    TONSILLECTOMY        Family History:     Family History   Problem Relation Age of Onset    Depression Mother     Aortic aneurysm Father       Social History:     Social History     Socioeconomic History    Marital status: /Civil Union     Spouse name: None    Number of children: None    Years of education: None    Highest education level: None   Occupational History    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    Smoking status: Never Smoker    Smokeless tobacco: Never Used   Substance and Sexual Activity    Alcohol use: Yes     Comment: per [employer]-has 3-4 drinks weekly    Drug use: Never     Comment: no use    Sexual activity: None   Other Topics Concern    None   Social History Narrative    Vegan Diet     Social Determinants of Health     Financial Resource Strain:     Difficulty of Paying Living Expenses:    Food Insecurity:     Worried About Running Out of Food in the Last Year:     Ran Out of Food in the Last Year:    Transportation Needs:     Lack of Transportation (Medical):      Lack of Transportation (Non-Medical):    Physical Activity:     Days of Exercise per Week:     Minutes of Exercise per Session:    Stress:     Feeling of Stress :    Social Connections:     Frequency of Communication with Friends and Family:     Frequency of Social Gatherings with Friends and Family:     Attends Catholic Services:     Active Member of Clubs or Organizations:     Attends Club or Organization Meetings:     Marital Status:    Intimate Partner Violence:     Fear of Current or Ex-Partner:     Emotionally Abused:     Physically Abused:     Sexually Abused:       Current Medications:     Current Outpatient Medications   Medication Sig Dispense Refill    amoxicillin (AMOXIL) 500 mg capsule Take 4 tabs prior to dental work 12 capsule 1    cyanocobalamin (VITAMIN B-12) 100 mcg tablet Take 1 tablet by mouth every other day      fluticasone (FLONASE) 50 mcg/act nasal spray 2 sprays daily      LORazepam (ATIVAN) 0 5 mg tablet Take 1 tablet (0 5 mg total) by mouth daily as needed for anxiety 30 tablet 1     No current facility-administered medications for this visit  Allergies: Allergies   Allergen Reactions    Dairy Aid [Lactase]     Eggs Or Egg-Derived Products - Food Allergy     Iron      Category: VOMITING;     Meat Extract       Physical Exam:     /72 (BP Location: Left arm, Patient Position: Sitting, Cuff Size: Adult)   Pulse 87   Temp 97 6 °F (36 4 °C) (Tympanic)   Resp 18   Ht 5' 11" (1 803 m)   Wt 67 1 kg (148 lb)   SpO2 98%   BMI 20 64 kg/m²     Physical Exam  Constitutional:       General: He is not in acute distress  Appearance: Normal appearance  He is well-developed  He is not diaphoretic  HENT:      Head: Normocephalic  Right Ear: External ear normal       Left Ear: External ear normal       Nose: Nose normal    Eyes:      General:         Right eye: No discharge  Left eye: No discharge        Conjunctiva/sclera: Conjunctivae normal       Pupils: Pupils are equal, round, and reactive to light    Neck:      Thyroid: No thyromegaly  Trachea: No tracheal deviation  Cardiovascular:      Rate and Rhythm: Normal rate and regular rhythm  Heart sounds: Murmur heard  No friction rub  Pulmonary:      Effort: Pulmonary effort is normal  No respiratory distress  Breath sounds: Normal breath sounds  No wheezing  Chest:      Chest wall: No tenderness  Abdominal:      General: There is no distension  Palpations: There is no mass  Tenderness: There is no abdominal tenderness  There is no guarding or rebound  Hernia: No hernia is present  Musculoskeletal:         General: No swelling or deformity  Cervical back: Normal range of motion  Right lower leg: No edema  Left lower leg: No edema  Skin:     Findings: No erythema or rash  Neurological:      General: No focal deficit present  Mental Status: He is alert  Cranial Nerves: No cranial nerve deficit  Coordination: Coordination normal    Psychiatric:         Thought Content:  Thought content normal           Emelyn Tillman MD  [address]

## 2021-09-10 DIAGNOSIS — Z20.822 CLOSE EXPOSURE TO COVID-19 VIRUS: Primary | ICD-10-CM

## 2021-09-10 NOTE — PROGRESS NOTES
Pt stated he was exposed to covid 2 days ago and needs to be tested since he is having symptoms  Pt stated he is having headaches and stuff nose  I did place the order for this

## 2021-09-13 PROCEDURE — U0005 INFEC AGEN DETEC AMPLI PROBE: HCPCS | Performed by: FAMILY MEDICINE

## 2021-09-13 PROCEDURE — U0003 INFECTIOUS AGENT DETECTION BY NUCLEIC ACID (DNA OR RNA); SEVERE ACUTE RESPIRATORY SYNDROME CORONAVIRUS 2 (SARS-COV-2) (CORONAVIRUS DISEASE [COVID-19]), AMPLIFIED PROBE TECHNIQUE, MAKING USE OF HIGH THROUGHPUT TECHNOLOGIES AS DESCRIBED BY CMS-2020-01-R: HCPCS | Performed by: FAMILY MEDICINE

## 2021-09-23 ENCOUNTER — APPOINTMENT (OUTPATIENT)
Dept: LAB | Facility: CLINIC | Age: 46
End: 2021-09-23
Payer: COMMERCIAL

## 2021-09-23 DIAGNOSIS — R79.82 CRP ELEVATED: ICD-10-CM

## 2021-09-23 DIAGNOSIS — E55.9 VITAMIN D DEFICIENCY: ICD-10-CM

## 2021-09-23 DIAGNOSIS — F41.9 ANXIETY: ICD-10-CM

## 2021-09-23 DIAGNOSIS — E78.2 MIXED HYPERLIPIDEMIA: ICD-10-CM

## 2021-09-23 LAB
25(OH)D3 SERPL-MCNC: 33 NG/ML (ref 30–100)
ALBUMIN SERPL BCP-MCNC: 3.7 G/DL (ref 3.5–5)
ALP SERPL-CCNC: 63 U/L (ref 46–116)
ALT SERPL W P-5'-P-CCNC: 18 U/L (ref 12–78)
ANION GAP SERPL CALCULATED.3IONS-SCNC: 2 MMOL/L (ref 4–13)
AST SERPL W P-5'-P-CCNC: 11 U/L (ref 5–45)
BASOPHILS # BLD AUTO: 0.05 THOUSANDS/ΜL (ref 0–0.1)
BASOPHILS NFR BLD AUTO: 1 % (ref 0–1)
BILIRUB SERPL-MCNC: 0.67 MG/DL (ref 0.2–1)
BUN SERPL-MCNC: 10 MG/DL (ref 5–25)
CALCIUM SERPL-MCNC: 8.8 MG/DL (ref 8.3–10.1)
CHLORIDE SERPL-SCNC: 108 MMOL/L (ref 100–108)
CHOLEST SERPL-MCNC: 202 MG/DL (ref 50–200)
CO2 SERPL-SCNC: 29 MMOL/L (ref 21–32)
CREAT SERPL-MCNC: 0.66 MG/DL (ref 0.6–1.3)
CRP SERPL QL: <3 MG/L
EOSINOPHIL # BLD AUTO: 0.08 THOUSAND/ΜL (ref 0–0.61)
EOSINOPHIL NFR BLD AUTO: 2 % (ref 0–6)
ERYTHROCYTE [DISTWIDTH] IN BLOOD BY AUTOMATED COUNT: 12.7 % (ref 11.6–15.1)
GFR SERPL CREATININE-BSD FRML MDRD: 117 ML/MIN/1.73SQ M
GLUCOSE P FAST SERPL-MCNC: 74 MG/DL (ref 65–99)
HCT VFR BLD AUTO: 45 % (ref 36.5–49.3)
HDLC SERPL-MCNC: 74 MG/DL
HGB BLD-MCNC: 14.6 G/DL (ref 12–17)
IMM GRANULOCYTES # BLD AUTO: 0.01 THOUSAND/UL (ref 0–0.2)
IMM GRANULOCYTES NFR BLD AUTO: 0 % (ref 0–2)
LDLC SERPL CALC-MCNC: 113 MG/DL (ref 0–100)
LYMPHOCYTES # BLD AUTO: 1.65 THOUSANDS/ΜL (ref 0.6–4.47)
LYMPHOCYTES NFR BLD AUTO: 34 % (ref 14–44)
MCH RBC QN AUTO: 30.2 PG (ref 26.8–34.3)
MCHC RBC AUTO-ENTMCNC: 32.4 G/DL (ref 31.4–37.4)
MCV RBC AUTO: 93 FL (ref 82–98)
MONOCYTES # BLD AUTO: 0.43 THOUSAND/ΜL (ref 0.17–1.22)
MONOCYTES NFR BLD AUTO: 9 % (ref 4–12)
NEUTROPHILS # BLD AUTO: 2.61 THOUSANDS/ΜL (ref 1.85–7.62)
NEUTS SEG NFR BLD AUTO: 54 % (ref 43–75)
NRBC BLD AUTO-RTO: 0 /100 WBCS
PLATELET # BLD AUTO: 234 THOUSANDS/UL (ref 149–390)
PMV BLD AUTO: 10 FL (ref 8.9–12.7)
POTASSIUM SERPL-SCNC: 3.7 MMOL/L (ref 3.5–5.3)
PROT SERPL-MCNC: 6.7 G/DL (ref 6.4–8.2)
RBC # BLD AUTO: 4.83 MILLION/UL (ref 3.88–5.62)
SODIUM SERPL-SCNC: 139 MMOL/L (ref 136–145)
TRIGL SERPL-MCNC: 73 MG/DL
TSH SERPL DL<=0.05 MIU/L-ACNC: 1.35 UIU/ML (ref 0.36–3.74)
WBC # BLD AUTO: 4.83 THOUSAND/UL (ref 4.31–10.16)

## 2021-09-23 PROCEDURE — 82306 VITAMIN D 25 HYDROXY: CPT

## 2021-09-23 PROCEDURE — 84443 ASSAY THYROID STIM HORMONE: CPT

## 2021-09-23 PROCEDURE — 86140 C-REACTIVE PROTEIN: CPT

## 2021-09-23 PROCEDURE — 36415 COLL VENOUS BLD VENIPUNCTURE: CPT

## 2021-09-23 PROCEDURE — 80053 COMPREHEN METABOLIC PANEL: CPT

## 2021-09-23 PROCEDURE — 80061 LIPID PANEL: CPT

## 2021-09-23 PROCEDURE — 85025 COMPLETE CBC W/AUTO DIFF WBC: CPT

## 2022-01-27 ENCOUNTER — CLINICAL SUPPORT (OUTPATIENT)
Dept: FAMILY MEDICINE CLINIC | Facility: CLINIC | Age: 47
End: 2022-01-27
Payer: COMMERCIAL

## 2022-01-27 DIAGNOSIS — S81.832A PUNCTURE WOUND OF LEFT LOWER LEG, INITIAL ENCOUNTER: ICD-10-CM

## 2022-01-27 DIAGNOSIS — Z23 NEED FOR TDAP VACCINATION: Primary | ICD-10-CM

## 2022-01-27 PROCEDURE — 90715 TDAP VACCINE 7 YRS/> IM: CPT

## 2022-01-27 PROCEDURE — 90471 IMMUNIZATION ADMIN: CPT

## 2022-05-12 ENCOUNTER — TELEPHONE (OUTPATIENT)
Dept: FAMILY MEDICINE CLINIC | Facility: CLINIC | Age: 47
End: 2022-05-12

## 2022-05-12 DIAGNOSIS — Q23.8 QUADRICUSPID AORTIC VALVE: ICD-10-CM

## 2022-05-13 RX ORDER — AMOXICILLIN 500 MG/1
CAPSULE ORAL
Qty: 12 CAPSULE | Refills: 1 | Status: SHIPPED | OUTPATIENT
Start: 2022-05-13 | End: 2023-05-14

## 2022-05-13 NOTE — TELEPHONE ENCOUNTER
Did not route to [facility] as they may reject since it is an antibiotic  Additionally, it is not listed on his med list at this time  Please refill if appropriate or advise of next steps  Thank you!

## 2022-05-13 NOTE — TELEPHONE ENCOUNTER
From: Rigo Irizarry  To: Office of Lissette Bui MD  Sent: 5/12/2022 5:56 PM EDT  Subject: Medication Renewal Request    Refills have been requested for the following medications: Other - Amoxicillin 500 MG 4 capsules per dentist visit premed for heart condition risk     Preferred pharmacy: [address] [place] [address] Joby Collins [address]

## 2022-09-07 ENCOUNTER — OFFICE VISIT (OUTPATIENT)
Dept: FAMILY MEDICINE CLINIC | Facility: CLINIC | Age: 47
End: 2022-09-07
Payer: COMMERCIAL

## 2022-09-07 VITALS
SYSTOLIC BLOOD PRESSURE: 108 MMHG | BODY MASS INDEX: 21.42 KG/M2 | WEIGHT: 153 LBS | DIASTOLIC BLOOD PRESSURE: 62 MMHG | HEIGHT: 71 IN | OXYGEN SATURATION: 99 % | HEART RATE: 90 BPM | TEMPERATURE: 98 F

## 2022-09-07 DIAGNOSIS — K44.9 HIATAL HERNIA: ICD-10-CM

## 2022-09-07 DIAGNOSIS — Z12.11 COLON CANCER SCREENING: Primary | ICD-10-CM

## 2022-09-07 DIAGNOSIS — F41.9 ANXIETY: ICD-10-CM

## 2022-09-07 DIAGNOSIS — I35.9 AORTIC VALVE DISORDER: ICD-10-CM

## 2022-09-07 DIAGNOSIS — E55.9 VITAMIN D DEFICIENCY: ICD-10-CM

## 2022-09-07 DIAGNOSIS — Z00.00 ANNUAL PHYSICAL EXAM: ICD-10-CM

## 2022-09-07 DIAGNOSIS — Q23.1 CONGENITAL INSUFFICIENCY OF AORTIC VALVE: ICD-10-CM

## 2022-09-07 DIAGNOSIS — E78.2 MIXED HYPERLIPIDEMIA: ICD-10-CM

## 2022-09-07 PROBLEM — R79.82 CRP ELEVATED: Status: RESOLVED | Noted: 2021-09-01 | Resolved: 2022-09-07

## 2022-09-07 PROCEDURE — 3725F SCREEN DEPRESSION PERFORMED: CPT | Performed by: FAMILY MEDICINE

## 2022-09-07 PROCEDURE — 99396 PREV VISIT EST AGE 40-64: CPT | Performed by: FAMILY MEDICINE

## 2022-09-07 NOTE — PATIENT INSTRUCTIONS

## 2022-09-07 NOTE — PROGRESS NOTES
[address] [facility]    NAME: Enrique Irizarry  AGE: 56 y o  SEX: male  : 1975     DATE: 2022     Assessment and Plan:     Problem List Items Addressed This Visit        Cardiovascular and Mediastinum    Aortic valve disorder    Congenital insufficiency of aortic valve     He will follow-up with [address] every 5 years  Other    Vitamin D deficiency    Relevant Orders    Comprehensive metabolic panel    Vitamin D 25 hydroxy    Mixed hyperlipidemia    Relevant Orders    Comprehensive metabolic panel    Lipid Panel with Direct LDL reflex    Hiatal hernia      Other Visit Diagnoses     Colon cancer screening    -  Primary    Relevant Orders    Cologuard    Annual physical exam              Immunizations and preventive care screenings were discussed with patient today  Appropriate education was printed on patient's after visit summary  Counseling:  Alcohol/drug use: discussed moderation in alcohol intake, the recommendations for healthy alcohol use, and avoidance of illicit drug use  Dental Health: discussed importance of regular tooth brushing, flossing, and dental visits  Sexual health: discussed sexually transmitted diseases, partner selection, use of condoms, avoidance of unintended pregnancy, and contraceptive alternatives  Exercise: the importance of regular exercise/physical activity was discussed  Recommend exercise 3-5 times per week for at least 30 minutes  Depression Screening and Follow-up Plan: Patient was screened for depression during today's encounter  They screened negative with a PHQ-2 score of 0  No follow-ups on file  Chief Complaint:     Chief Complaint   Patient presents with    Physical Exam      History of Present Illness:     Adult Annual Physical   Patient here for a comprehensive physical exam  The patient reports no problems    He has a history of a quadracuspid aortic valve  He follows up at [facility] every 5 years at this point  He denies any chest pain, shortness of breath or palpitations  He has history of elevated cholesterol but high HDL  Diet and Physical Activity  Diet/Nutrition: well balanced diet  Exercise: vigorous cardiovascular exercise  Depression Screening  PHQ-2/9 Depression Screening    Little interest or pleasure in doing things: 0 - not at all  Feeling down, depressed, or hopeless: 0 - not at all  PHQ-2 Score: 0  PHQ-2 Interpretation: Negative depression screen       General Health  Sleep: sleeps well  Hearing: normal - bilateral   Vision: no vision problems  Dental: regular dental visits   Health  Symptoms include: none     Review of Systems:     Review of Systems   Constitutional: Negative for appetite change, chills, fatigue, fever and unexpected weight change  HENT: Negative for trouble swallowing  Eyes: Negative for visual disturbance  Respiratory: Negative for cough, chest tightness, shortness of breath and wheezing  Cardiovascular: Negative for chest pain, palpitations and leg swelling  Gastrointestinal: Negative for abdominal distention, abdominal pain, blood in stool, constipation and diarrhea  Endocrine: Negative for polyuria  Genitourinary: Negative for difficulty urinating and flank pain  Musculoskeletal: Negative for arthralgias and myalgias  Skin: Negative for rash  Neurological: Negative for dizziness and light-headedness  Hematological: Negative for adenopathy  Does not bruise/bleed easily  Psychiatric/Behavioral: Negative for dysphoric mood and sleep disturbance  The patient is not nervous/anxious         Past Medical History:     Past Medical History:   Diagnosis Date    Aortic valve, quadricuspid     Campylobacter diarrhea 8/11/2020    Cough variant asthma     last assessed: 8/14/2016    CRP elevated 9/1/2021    Depression     last assessed: 10/22/2012    Palpitations last assessed: 5/19/2014      Past Surgical History:     Past Surgical History:   Procedure Laterality Date    TONSILLECTOMY        Family History:     Family History   Problem Relation Age of Onset    Depression Mother     Aortic aneurysm Father       Social History:     Social History     Socioeconomic History    Marital status: /Civil Union     Spouse name: None    Number of children: None    Years of education: None    Highest education level: None   Occupational History    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    Smoking status: Never Smoker    Smokeless tobacco: Never Used   Vaping Use    Vaping Use: Never used   Substance and Sexual Activity    Alcohol use: Not Currently    Drug use: Never     Comment: no use    Sexual activity: Yes     Partners: Male   Other Topics Concern    None   Social History Narrative    Vegan Diet     Social Determinants of Health     Financial Resource Strain: Not on file   Food Insecurity: Not on file   Transportation Needs: Not on file   Physical Activity: Not on file   Stress: Not on file   Social Connections: Not on file   Intimate Partner Violence: Not on file   Housing Stability: Not on file      Current Medications:     Current Outpatient Medications   Medication Sig Dispense Refill    amoxicillin (AMOXIL) 500 mg capsule Take 4 tabs prior to dental work 12 capsule 1    cyanocobalamin (VITAMIN B-12) 100 mcg tablet Take 1 tablet by mouth every other day      fluticasone (FLONASE) 50 mcg/act nasal spray 2 sprays daily      LORazepam (ATIVAN) 0 5 mg tablet Take 1 tablet (0 5 mg total) by mouth daily as needed for anxiety 30 tablet 1     No current facility-administered medications for this visit  Allergies:      Allergies   Allergen Reactions    Dairy Aid [Lactase]     Eggs Or Egg-Derived Products - Food Allergy     Iron      Category: VOMITING;     Meat Extract       Physical Exam:     /62 (BP Location: Left arm, Patient Position: Sitting, Cuff Size: Large)   Pulse 90   Temp 98 °F (36 7 °C)   Ht 5' 11" (1 803 m)   Wt 69 4 kg (153 lb)   SpO2 99%   BMI 21 34 kg/m²     Physical Exam  Constitutional:       General: He is not in acute distress  Appearance: Normal appearance  He is well-developed  He is not diaphoretic  HENT:      Head: Normocephalic  Right Ear: External ear normal       Left Ear: External ear normal       Nose: Nose normal    Eyes:      General:         Right eye: No discharge  Left eye: No discharge  Conjunctiva/sclera: Conjunctivae normal       Pupils: Pupils are equal, round, and reactive to light  Neck:      Thyroid: No thyromegaly  Trachea: No tracheal deviation  Cardiovascular:      Rate and Rhythm: Normal rate and regular rhythm  Heart sounds: Normal heart sounds  No murmur heard  No friction rub  Pulmonary:      Effort: Pulmonary effort is normal  No respiratory distress  Breath sounds: Normal breath sounds  No wheezing  Chest:      Chest wall: No tenderness  Abdominal:      General: There is no distension  Palpations: There is no mass  Tenderness: There is no abdominal tenderness  There is no guarding or rebound  Hernia: No hernia is present  Musculoskeletal:         General: No swelling or deformity  Cervical back: Normal range of motion  Right lower leg: No edema  Left lower leg: No edema  Skin:     Findings: No erythema or rash  Neurological:      General: No focal deficit present  Mental Status: He is alert  Cranial Nerves: No cranial nerve deficit  Coordination: Coordination normal    Psychiatric:         Thought Content:  Thought content normal           Dianne Keane MD  [address]

## 2022-09-14 RX ORDER — LORAZEPAM 0.5 MG/1
0.5 TABLET ORAL DAILY PRN
Qty: 30 TABLET | Refills: 0 | Status: SHIPPED | OUTPATIENT
Start: 2022-09-14 | End: 2022-10-10 | Stop reason: SDUPTHER

## 2022-10-10 DIAGNOSIS — F41.9 ANXIETY: ICD-10-CM

## 2022-10-12 RX ORDER — LORAZEPAM 0.5 MG/1
0.5 TABLET ORAL DAILY PRN
Qty: 30 TABLET | Refills: 0 | Status: SHIPPED | OUTPATIENT
Start: 2022-10-12

## 2022-12-07 ENCOUNTER — APPOINTMENT (OUTPATIENT)
Dept: LAB | Facility: CLINIC | Age: 47
End: 2022-12-07

## 2022-12-07 DIAGNOSIS — Z12.11 COLON CANCER SCREENING: ICD-10-CM

## 2022-12-07 DIAGNOSIS — E55.9 VITAMIN D DEFICIENCY: ICD-10-CM

## 2022-12-07 DIAGNOSIS — E78.2 MIXED HYPERLIPIDEMIA: ICD-10-CM

## 2022-12-07 LAB
25(OH)D3 SERPL-MCNC: 18 NG/ML (ref 30–100)
ALBUMIN SERPL BCP-MCNC: 3.6 G/DL (ref 3.5–5)
ALP SERPL-CCNC: 65 U/L (ref 46–116)
ALT SERPL W P-5'-P-CCNC: 21 U/L (ref 12–78)
ANION GAP SERPL CALCULATED.3IONS-SCNC: 5 MMOL/L (ref 4–13)
AST SERPL W P-5'-P-CCNC: 12 U/L (ref 5–45)
BILIRUB SERPL-MCNC: 0.68 MG/DL (ref 0.2–1)
BUN SERPL-MCNC: 12 MG/DL (ref 5–25)
CALCIUM SERPL-MCNC: 9.4 MG/DL (ref 8.3–10.1)
CHLORIDE SERPL-SCNC: 107 MMOL/L (ref 96–108)
CHOLEST SERPL-MCNC: 195 MG/DL
CO2 SERPL-SCNC: 29 MMOL/L (ref 21–32)
CREAT SERPL-MCNC: 0.76 MG/DL (ref 0.6–1.3)
GFR SERPL CREATININE-BSD FRML MDRD: 109 ML/MIN/1.73SQ M
GLUCOSE P FAST SERPL-MCNC: 78 MG/DL (ref 65–99)
HDLC SERPL-MCNC: 75 MG/DL
LDLC SERPL CALC-MCNC: 106 MG/DL (ref 0–100)
POTASSIUM SERPL-SCNC: 3.9 MMOL/L (ref 3.5–5.3)
PROT SERPL-MCNC: 7 G/DL (ref 6.4–8.4)
SODIUM SERPL-SCNC: 141 MMOL/L (ref 135–147)
TRIGL SERPL-MCNC: 72 MG/DL

## 2022-12-08 DIAGNOSIS — E55.9 VITAMIN D DEFICIENCY: Primary | ICD-10-CM

## 2022-12-08 RX ORDER — ERGOCALCIFEROL (VITAMIN D2) 1250 MCG
50000 CAPSULE ORAL WEEKLY
Qty: 12 CAPSULE | Refills: 0 | Status: SHIPPED | OUTPATIENT
Start: 2022-12-08

## 2023-03-25 ENCOUNTER — AMB VIDEO VISIT (OUTPATIENT)
Dept: OTHER | Facility: HOSPITAL | Age: 48
End: 2023-03-25

## 2023-03-25 NOTE — PROGRESS NOTES
Video Visit - Ja Irizarry 56 y o  male MRN: [id number]    REQUIRED DOCUMENTATION:         1  This [facility] was [facility] via [facility]  2  Provider located at [address] [place] [id number] [phone]  3  [facility] provider: Kash Galeas PA-C   4  Identify all parties in room with patient during [facility] visit:  Self and   5  After connecting through [facility], patient was identified by name and date of birth  Patient was then informed that this was a Telemedicine visit and that the exam was being conducted confidentially over secure lines  My office door was closed  No one else was in the room  Patient acknowledged consent and understanding of privacy and security of the Telemedicine visit  I informed the patient that I have reviewed their record in Epic and presented the opportunity for them to ask any questions regarding the visit today  The patient agreed to participate  56 y o  male presents after COVID + test on 3/24/2023, symptoms began on 3/23/2023, + nasal congestion, sinus, congestion, scratchy throat and cough,  is also COVID +  Denies, fevers, chills, N/V/D, SOB, CP, HA, blurry vision, dizziness, history of asthma, COPD, kidney disease, HTN, or obesity  Review of Systems   Constitutional: Negative for chills and fever  HENT: Positive for congestion, ear pain, postnasal drip, rhinorrhea, sinus pressure and sore throat  Respiratory: Positive for cough  Musculoskeletal: Positive for myalgias  All other systems reviewed and are negative  There were no vitals filed for this visit  Physical Exam  Constitutional:       General: He is not in acute distress  Appearance: Normal appearance  He is normal weight  He is not ill-appearing or toxic-appearing  HENT:      Head: Normocephalic and atraumatic  Right Ear: External ear normal       Left Ear: External ear normal       Nose: Congestion and rhinorrhea present  Mouth/Throat:      Mouth: Mucous membranes are moist    Eyes:      Conjunctiva/sclera: Conjunctivae normal    Pulmonary:      Effort: Pulmonary effort is normal  No respiratory distress  Breath sounds: No stridor  No wheezing  Comments: No audible or observable signs of respiratory distress throughout visit  Pt was sitting comfortably, able to converse in complete sentences, no audible, wheezing, grunting or stridor, no nasal flaring or accessory muscle usage  Neurological:      Mental Status: He is alert and oriented to person, place, and time  Mental status is at baseline  Psychiatric:         Mood and Affect: Mood normal          Behavior: Behavior normal          Thought Content: Thought content normal          Judgment: Judgment normal        There are no diagnoses linked to this encounter  Patient Instructions   Continue with supportive care/symptom management, Tylenol and Motrin as needed for pain/fever, Add Flonase twice daily for sinus symptoms, can take over the counter multi-symptom cold/flu medication as needed, Vitamin D3 is recommended daily, plenty of fluids for hydration and plenty of rest, t Follow up with PCP in 2 days if symptoms persist; Go to ER if worsening symptoms, development of shortness of breath, chest pain, dizziness, fainting, inability to tolerate food or fluids, headache or fever not relieved by tylenol  Follow up with PCP if not improved, if symptoms are worse, go to the ER

## 2023-03-25 NOTE — PATIENT INSTRUCTIONS
Continue with supportive care/symptom management, Tylenol and Motrin as needed for pain/fever, Add Flonase twice daily for sinus symptoms, can take over the counter multi-symptom cold/flu medication as needed, Vitamin D3 is recommended daily, plenty of fluids for hydration and plenty of rest, t Follow up with PCP in 2 days if symptoms persist; Go to ER if worsening symptoms, development of shortness of breath, chest pain, dizziness, fainting, inability to tolerate food or fluids, headache or fever not relieved by tylenol

## 2023-03-27 ENCOUNTER — OFFICE VISIT (OUTPATIENT)
Dept: FAMILY MEDICINE CLINIC | Facility: CLINIC | Age: 48
End: 2023-03-27

## 2023-03-27 VITALS
SYSTOLIC BLOOD PRESSURE: 110 MMHG | BODY MASS INDEX: 21.31 KG/M2 | TEMPERATURE: 101.3 F | DIASTOLIC BLOOD PRESSURE: 64 MMHG | OXYGEN SATURATION: 98 % | WEIGHT: 152.8 LBS | HEART RATE: 94 BPM

## 2023-03-27 DIAGNOSIS — Q23.1 CONGENITAL INSUFFICIENCY OF AORTIC VALVE: ICD-10-CM

## 2023-03-27 DIAGNOSIS — Q23.8 QUADRICUSPID AORTIC VALVE: ICD-10-CM

## 2023-03-27 DIAGNOSIS — U07.1 COVID-19: Primary | ICD-10-CM

## 2023-03-27 RX ORDER — DEXTROMETHORPHAN HYDROBROMIDE AND PROMETHAZINE HYDROCHLORIDE 15; 6.25 MG/5ML; MG/5ML
5 SOLUTION ORAL 4 TIMES DAILY PRN
Qty: 180 ML | Refills: 0 | Status: SHIPPED | OUTPATIENT
Start: 2023-03-27

## 2023-03-27 RX ORDER — NIRMATRELVIR AND RITONAVIR 300-100 MG
3 KIT ORAL 2 TIMES DAILY
Qty: 30 TABLET | Refills: 0 | Status: SHIPPED | OUTPATIENT
Start: 2023-03-27 | End: 2023-04-01

## 2023-03-27 NOTE — PROGRESS NOTES
COVID-19 Outpatient Progress Note    Assessment/Plan:    Problem List Items Addressed This Visit        Cardiovascular and Mediastinum    Congenital insufficiency of aortic valve    Quadricuspid aortic valve     Continue routine follow-up with his cardiologist at [facility]  Other    COVID-19 - Primary     Patient tested positive for COVID-19 a few days ago  I placed him on Paxlovid as his symptoms are still quite significant  I also gave him promethazine cough syrup as he has a severe cough  Notify me if symptoms are getting any worse  Relevant Medications    Promethazine-DM (PHENERGAN-DM) 6 25-15 mg/5 mL oral syrup    nirmatrelvir & ritonavir (Paxlovid, 300/100,) tablet therapy pack      Disposition:     I have spent a total time of 15 minutes on the day of the encounter for this patient including risks and benefits of treatment options and impressions  Encounter provider: Elizabeth Jimenez MD     Provider located at: [place] [address] [facility]  [address] [place] [id number] [phone]     Recent Visits  No visits were found meeting these conditions  Showing recent visits within past 7 days and meeting all other requirements  Today's Visits  Date Type Provider Dept   03/27/23 Office Visit Elizabeth Jimenez MD [facility]   Showing today's visits and meeting all other requirements  Future Appointments  No visits were found meeting these conditions  Showing future appointments within next 150 days and meeting all other requirements     Subjective:   Marquita Amaya is a 56 y o  male who is concerned about COVID-19  Patient's symptoms include fever, chills, nasal congestion, rhinorrhea, sore throat, cough, diarrhea, myalgias and headache   Patient denies anosmia, loss of taste, shortness of breath, chest tightness, abdominal pain, nausea and vomiting      - Date of symptom onset: 3/24/2023      COVID-19 vaccination status: Fully vaccinated with booster    The patient tested positive with a home test on 3/24  Symptoms are still quite severe with his severe cough and sore throat  He has some mild shortness of breath when coughing  Lab Results   Component Value Date    SARSCOV2 Negative 09/13/2021    SARSCOV2 Not Detected 08/06/2020       Review of Systems   Constitutional: Positive for chills and fever  HENT: Positive for congestion, rhinorrhea, sore throat and trouble swallowing  Negative for drooling, ear discharge and ear pain  Respiratory: Positive for cough  Negative for chest tightness, shortness of breath and stridor  Gastrointestinal: Positive for diarrhea  Negative for abdominal pain, nausea and vomiting  Musculoskeletal: Positive for myalgias  Negative for neck pain  Neurological: Positive for headaches  Current Outpatient Medications on File Prior to Visit   Medication Sig   • amoxicillin (AMOXIL) 500 mg capsule Take 4 tabs prior to dental work   • cyanocobalamin (VITAMIN B-12) 100 mcg tablet Take 1 tablet by mouth every other day   • ergocalciferol (ERGOCALCIFEROL) 1 25 MG (15058 UT) capsule Take 1 capsule (50,000 Units total) by mouth once a week   • fluticasone (FLONASE) 50 mcg/act nasal spray 2 sprays daily   • LORazepam (ATIVAN) 0 5 mg tablet Take 1 tablet (0 5 mg total) by mouth daily as needed for anxiety       Objective:    /64 (BP Location: Left arm, Patient Position: Sitting, Cuff Size: Adult)   Pulse 94   Temp (!) 101 3 °F (38 5 °C) (Tympanic)   Wt 69 3 kg (152 lb 12 8 oz)   SpO2 98%   BMI 21 31 kg/m²      Physical Exam  Constitutional:       General: He is not in acute distress  Appearance: Normal appearance  He is well-developed  He is not diaphoretic  HENT:      Head: Normocephalic  Right Ear: External ear normal       Left Ear: External ear normal       Nose: Nose normal    Eyes:      General:         Right eye: No discharge           Left eye: No discharge  Conjunctiva/sclera: Conjunctivae normal       Pupils: Pupils are equal, round, and reactive to light  Neck:      Thyroid: No thyromegaly  Trachea: No tracheal deviation  Cardiovascular:      Rate and Rhythm: Normal rate and regular rhythm  Heart sounds: Normal heart sounds  No murmur heard  No friction rub  Pulmonary:      Effort: Pulmonary effort is normal  No respiratory distress  Breath sounds: Normal breath sounds  No wheezing  Chest:      Chest wall: No tenderness  Abdominal:      General: There is no distension  Palpations: There is no mass  Tenderness: There is no abdominal tenderness  There is no guarding or rebound  Hernia: No hernia is present  Musculoskeletal:         General: No swelling or deformity  Cervical back: Normal range of motion  Right lower leg: No edema  Left lower leg: No edema  Skin:     Findings: No erythema or rash  Neurological:      General: No focal deficit present  Mental Status: He is alert  Cranial Nerves: No cranial nerve deficit  Coordination: Coordination normal    Psychiatric:         Thought Content: Thought content normal        Mino Lambert MD  Answers for HPI/ROS submitted by the patient on 3/27/2023  Chronicity: new  Onset: in the past 7 days  Progression since onset: rapidly worsening  Pain worse on: neither  Fever: 100 - 100 9 F  Fever duration: 3 to 4 days  Pain - numeric: 8/10  hoarse voice: No  plugged ear sensation: No  swollen glands: No  strep: No  mono:  No

## 2023-03-27 NOTE — ASSESSMENT & PLAN NOTE
Patient tested positive for COVID-19 a few days ago  I placed him on Paxlovid as his symptoms are still quite significant  I also gave him promethazine cough syrup as he has a severe cough  Notify me if symptoms are getting any worse

## 2023-03-31 ENCOUNTER — OFFICE VISIT (OUTPATIENT)
Dept: URGENT CARE | Facility: MEDICAL CENTER | Age: 48
End: 2023-03-31

## 2023-03-31 VITALS
DIASTOLIC BLOOD PRESSURE: 68 MMHG | TEMPERATURE: 97 F | HEART RATE: 80 BPM | HEIGHT: 71 IN | RESPIRATION RATE: 18 BRPM | SYSTOLIC BLOOD PRESSURE: 110 MMHG | WEIGHT: 146 LBS | BODY MASS INDEX: 20.44 KG/M2 | OXYGEN SATURATION: 99 %

## 2023-03-31 DIAGNOSIS — U07.1 COVID-19: ICD-10-CM

## 2023-03-31 DIAGNOSIS — U07.1 COVID-19: Primary | ICD-10-CM

## 2023-03-31 DIAGNOSIS — J02.9 SORE THROAT: Primary | ICD-10-CM

## 2023-03-31 LAB — S PYO AG THROAT QL: NEGATIVE

## 2023-03-31 RX ORDER — METHYLPREDNISOLONE 4 MG/1
TABLET ORAL
Qty: 21 TABLET | Refills: 0 | Status: SHIPPED | OUTPATIENT
Start: 2023-03-31

## 2023-03-31 NOTE — PROGRESS NOTES
"  [facility]        NAME: Alexus No is a 56 y o  male  : 1975    MRN: [id number]  DATE: 2023  TIME: 1:44 PM    Assessment and Plan   Sore throat [J02 9]  1  Sore throat  POCT rapid strepA    Throat culture      2  COVID-19          POCT rapid strep negative  Will send to lab for culture  Will call the patient if results are positive to begin appropriate antibiotic therapy  The patient was instructed to continue with COVID symptom management as directed by his PCP  Patient Instructions       Follow up with PCP in 3-5 days  Proceed to  ER if symptoms worsen  Chief Complaint     Chief Complaint   Patient presents with   • Cold Like Symptoms     Pt tested COVID pos one week ago  Pt states that cough has imporved but sore throat sx has been stable and feels like he has, \"really bad strep throat\"  Sent here by family doc  Almost finished with plaxlovid  Had fevers on sx onset but no longer experiencing fevers as of Wednesday  Requesting strep test           History of Present Illness       45-year-old male presents to the urgent care today for evaluation of ongoing COVID symptoms and sore throat  Patient states that a week ago today he tested positive for COVID  The patient has been experiencing congestion, headache, coughing, fever, sweating, body aches  He saw his family doctor on Monday, who started him on Paxlovid  The patient attempted to see him today regarding his sore throat, however, the office was busy  The patient states that he has had a sore throat since the start of his illness, but the unchanged severe pain worrisome  The patient denies any shortness of breath, chest pain, dizziness  For symptom relief the patient has been using Motrin, Robitussin, and nighttime cough syrup prescribed by his PCP  He is also used throat lozenges  The patient denies any known sick contacts, he denies having any children or at school or               Review of Systems " Review of Systems   Constitutional: Positive for fatigue and fever  Negative for chills  HENT: Positive for congestion, postnasal drip and sore throat  Negative for ear pain  Eyes: Negative for pain and visual disturbance  Respiratory: Positive for cough  Negative for chest tightness, shortness of breath and wheezing  Cardiovascular: Negative for chest pain and palpitations  Gastrointestinal: Negative for abdominal pain, constipation, diarrhea, nausea and vomiting  Genitourinary: Negative for dysuria and hematuria  Musculoskeletal: Positive for myalgias  Negative for arthralgias and back pain  Skin: Negative for color change and rash  Neurological: Positive for headaches  Negative for dizziness, seizures and syncope  Psychiatric/Behavioral: Negative  All other systems reviewed and are negative          Current Medications       Current Outpatient Medications:   •  ergocalciferol (ERGOCALCIFEROL) 1 25 MG (52710 UT) capsule, Take 1 capsule (50,000 Units total) by mouth once a week, Disp: 12 capsule, Rfl: 0  •  fluticasone (FLONASE) 50 mcg/act nasal spray, 2 sprays daily, Disp: , Rfl:   •  LORazepam (ATIVAN) 0 5 mg tablet, Take 1 tablet (0 5 mg total) by mouth daily as needed for anxiety, Disp: 30 tablet, Rfl: 0  •  nirmatrelvir & ritonavir (Paxlovid, 300/100,) tablet therapy pack, Take 3 tablets by mouth 2 (two) times a day for 5 days Take 2 nirmatrelvir tablets + 1 ritonavir tablet together per dose, Disp: 30 tablet, Rfl: 0  •  Promethazine-DM (PHENERGAN-DM) 6 25-15 mg/5 mL oral syrup, Take 5 mL by mouth 4 (four) times a day as needed for cough, Disp: 180 mL, Rfl: 0  •  amoxicillin (AMOXIL) 500 mg capsule, Take 4 tabs prior to dental work (Patient not taking: Reported on 3/31/2023), Disp: 12 capsule, Rfl: 1  •  cyanocobalamin (VITAMIN B-12) 100 mcg tablet, Take 1 tablet by mouth every other day (Patient not taking: Reported on 3/31/2023), Disp: , Rfl:     Current Allergies     Allergies as "of 03/31/2023 - Reviewed 03/31/2023   Allergen Reaction Noted   • Dairy aid [tilactase]  03/02/2019   • Eggs or egg-derived products - food allergy  03/02/2019   • Iron  10/22/2012   • Meat extract  03/02/2019            The following portions of the patient's history were reviewed and updated as appropriate: allergies, current medications, past family history, past medical history, past social history, past surgical history and problem list      Past Medical History:   Diagnosis Date   • Aortic valve, quadricuspid    • Campylobacter diarrhea 8/11/2020   • Cough variant asthma     last assessed: 8/14/2016   • CRP elevated 9/1/2021   • Depression     last assessed: 10/22/2012   • Palpitations     last assessed: 5/19/2014       Past Surgical History:   Procedure Laterality Date   • TONSILLECTOMY         Family History   Problem Relation Age of Onset   • Depression Mother    • Aortic aneurysm Father          Medications have been verified  Objective   /68 (BP Location: Right arm, Patient Position: Sitting, Cuff Size: Standard)   Pulse 80   Temp (!) 97 °F (36 1 °C) (Tympanic)   Resp 18   Ht 5' 11\" (1 803 m)   Wt 66 2 kg (146 lb)   SpO2 99%   BMI 20 36 kg/m²        Physical Exam     Physical Exam  Vitals and nursing note reviewed  Constitutional:       General: He is not in acute distress  Appearance: Normal appearance  He is not ill-appearing, toxic-appearing or diaphoretic  HENT:      Head: Normocephalic and atraumatic  Right Ear: Tympanic membrane normal       Left Ear: Tympanic membrane normal       Nose: Nose normal       Mouth/Throat:      Mouth: Mucous membranes are moist       Pharynx: Oropharynx is clear  No oropharyngeal exudate or posterior oropharyngeal erythema  Comments: Cobblestoning  Eyes:      Extraocular Movements: Extraocular movements intact  Pupils: Pupils are equal, round, and reactive to light     Cardiovascular:      Rate and Rhythm: Normal rate and " regular rhythm  Pulses: Normal pulses  Heart sounds: Normal heart sounds  No murmur heard  Pulmonary:      Effort: Pulmonary effort is normal  No respiratory distress  Breath sounds: Normal breath sounds  No wheezing or rhonchi  Abdominal:      Palpations: Abdomen is soft  Musculoskeletal:         General: Normal range of motion  Cervical back: Normal range of motion  Skin:     General: Skin is warm and dry  Capillary Refill: Capillary refill takes less than 2 seconds  Neurological:      General: No focal deficit present  Mental Status: He is alert and oriented to person, place, and time     Psychiatric:         Mood and Affect: Mood normal          Behavior: Behavior normal

## 2023-04-02 LAB — BACTERIA THROAT CULT: NORMAL

## 2023-05-01 ENCOUNTER — APPOINTMENT (OUTPATIENT)
Dept: RADIOLOGY | Facility: MEDICAL CENTER | Age: 48
End: 2023-05-01

## 2023-05-01 ENCOUNTER — OFFICE VISIT (OUTPATIENT)
Dept: URGENT CARE | Facility: MEDICAL CENTER | Age: 48
End: 2023-05-01

## 2023-05-01 VITALS
OXYGEN SATURATION: 100 % | DIASTOLIC BLOOD PRESSURE: 81 MMHG | RESPIRATION RATE: 20 BRPM | TEMPERATURE: 96.8 F | HEART RATE: 66 BPM | SYSTOLIC BLOOD PRESSURE: 138 MMHG

## 2023-05-01 DIAGNOSIS — R07.81 RIB PAIN ON LEFT SIDE: Primary | ICD-10-CM

## 2023-05-01 DIAGNOSIS — R07.81 RIB PAIN ON LEFT SIDE: ICD-10-CM

## 2023-05-01 LAB
ATRIAL RATE: 69 BPM
P AXIS: 34 DEGREES
PR INTERVAL: 142 MS
QRS AXIS: 59 DEGREES
QRSD INTERVAL: 102 MS
QT INTERVAL: 396 MS
QTC INTERVAL: 405 MS
T WAVE AXIS: -6 DEGREES
VENTRICULAR RATE: 63 BPM

## 2023-05-01 NOTE — PROGRESS NOTES
[facility]        NAME: Damian Denise is a 53 y o  male  : 1975    MRN: [id number]  DATE: May 2, 2023  TIME: 9:02 AM    Assessment and Plan   Rib pain on left side [R07 81]  1  Rib pain on left side  XR ribs left w pa chest min 3 views        Patient presents with c/o left sided anterior and posterior rib pain with cough, sneeze, deep breath off and on since   Recently recovering from [place]  Denies dizziness, numbness, tingling  Denies n/v/sweats/chills/fevers  Has used motrin at night with come relief  Pt does have cardiac history  EKG does not indicate acute issues  Xray notes  No fractures, no pulmonary disease  Advised to f/u with specialists and proceed to ER for worsening/concerns  Patient Instructions       Follow up with PCP /specialist as needed  Proceed to  ER if symptoms worsen  Chief Complaint     Chief Complaint   Patient presents with    Chest Pain     Stated that he had a previous cough and covid and since has been having left side chest/rib pain since   No radiation on pain  Some SOB at times  Pain worse with movement  History of Present Illness       Patient presents with c/o left sided anterior and posterior rib pain with cough, sneeze, deep breath off and on since   Recently recovering from [place]  Denies dizziness, numbness, tingling  Denies n/v/sweats/chills/fevers  Has used motrin at night with come relief  Pt does have cardiac history  EKG does not indicate acute issues  Xray notes  No fractures, no pulmonary disease  Advised to f/u with specialists and proceed to ER for worsening/concerns  Review of Systems   Review of Systems   Constitutional: Negative for activity change, appetite change and fever  HENT: Negative for congestion and postnasal drip  Respiratory: Positive for cough, chest tightness and shortness of breath           SOB with exertion, chest tightness/pain with cough   Cardiovascular: Negative for chest pain and palpitations  Gastrointestinal: Negative for nausea and vomiting  Musculoskeletal: Positive for arthralgias and myalgias  Negative for joint swelling  Skin: Negative for color change  Neurological: Negative for dizziness, numbness and headaches           Current Medications       Current Outpatient Medications:     ergocalciferol (ERGOCALCIFEROL) 1 25 MG (01005 UT) capsule, Take 1 capsule (50,000 Units total) by mouth once a week, Disp: 12 capsule, Rfl: 0    fluticasone (FLONASE) 50 mcg/act nasal spray, 2 sprays daily, Disp: , Rfl:     LORazepam (ATIVAN) 0 5 mg tablet, Take 1 tablet (0 5 mg total) by mouth daily as needed for anxiety, Disp: 30 tablet, Rfl: 0    amoxicillin (AMOXIL) 500 mg capsule, Take 4 tabs prior to dental work (Patient not taking: Reported on 3/31/2023), Disp: 12 capsule, Rfl: 1    cyanocobalamin (VITAMIN B-12) 100 mcg tablet, Take 1 tablet by mouth every other day (Patient not taking: Reported on 3/31/2023), Disp: , Rfl:     methylPREDNISolone 4 MG tablet therapy pack, Use as directed on package (Patient not taking: Reported on 5/1/2023), Disp: 21 tablet, Rfl: 0    Promethazine-DM (PHENERGAN-DM) 6 25-15 mg/5 mL oral syrup, Take 5 mL by mouth 4 (four) times a day as needed for cough (Patient not taking: Reported on 5/1/2023), Disp: 180 mL, Rfl: 0    Current Allergies     Allergies as of 05/01/2023 - Reviewed 05/01/2023   Allergen Reaction Noted    Eggs or egg-derived products - food allergy  03/02/2019    Iron  10/22/2012    Meat extract  03/02/2019    Tilactase  03/02/2019            The following portions of the patient's history were reviewed and updated as appropriate: allergies, current medications, past family history, past medical history, past social history, past surgical history and problem list      Past Medical History:   Diagnosis Date    Aortic valve, quadricuspid     Campylobacter diarrhea 8/11/2020    Cough variant asthma     last assessed: 8/14/2016    CRP elevated 9/1/2021    Depression     last assessed: 10/22/2012    Palpitations     last assessed: 5/19/2014       Past Surgical History:   Procedure Laterality Date    TONSILLECTOMY         Family History   Problem Relation Age of Onset    Depression Mother     Aortic aneurysm Father          Medications have been verified  Objective   /81   Pulse 66   Temp (!) 96 8 °F (36 °C)   Resp 20   SpO2 100%   No LMP for male patient  Physical Exam     Physical Exam  Vitals reviewed  Constitutional:       Appearance: He is well-developed  HENT:      Head: Normocephalic  Eyes:      Pupils: Pupils are equal, round, and reactive to light  Cardiovascular:      Rate and Rhythm: Normal rate and regular rhythm  Heart sounds: Normal heart sounds  Pulmonary:      Effort: Pulmonary effort is normal  No tachypnea or respiratory distress  Breath sounds: Normal breath sounds  Musculoskeletal:      Cervical back: Normal range of motion  Lymphadenopathy:      Cervical: No cervical adenopathy  Neurological:      Mental Status: He is alert

## 2023-06-29 DIAGNOSIS — E55.9 VITAMIN D DEFICIENCY: ICD-10-CM

## 2023-06-29 DIAGNOSIS — I35.9 AORTIC VALVE DISORDER: Primary | ICD-10-CM

## 2023-06-29 DIAGNOSIS — E78.2 MIXED HYPERLIPIDEMIA: ICD-10-CM

## 2023-06-29 DIAGNOSIS — Q23.1 CONGENITAL INSUFFICIENCY OF AORTIC VALVE: ICD-10-CM

## 2023-06-29 NOTE — PROGRESS NOTES
Labs were placed at the patient's request for his upcoming visit at [facility]    Problem List Items Addressed This Visit        Cardiovascular and Mediastinum    Aortic valve disorder - Primary    Relevant Orders    CBC and differential    Comprehensive metabolic panel    Congenital insufficiency of aortic valve       Other    Vitamin D deficiency    Relevant Orders    Vitamin D 25 hydroxy    Mixed hyperlipidemia    Relevant Orders    Comprehensive metabolic panel    Lipid Panel with Direct LDL reflex

## 2023-08-09 ENCOUNTER — LAB (OUTPATIENT)
Dept: LAB | Facility: CLINIC | Age: 48
End: 2023-08-09
Payer: COMMERCIAL

## 2023-08-09 DIAGNOSIS — E78.2 MIXED HYPERLIPIDEMIA: ICD-10-CM

## 2023-08-09 DIAGNOSIS — E55.9 VITAMIN D DEFICIENCY: ICD-10-CM

## 2023-08-09 DIAGNOSIS — I35.9 AORTIC VALVE DISORDER: ICD-10-CM

## 2023-08-09 LAB
25(OH)D3 SERPL-MCNC: 31.6 NG/ML (ref 30–100)
ALBUMIN SERPL BCP-MCNC: 3.9 G/DL (ref 3.5–5)
ALP SERPL-CCNC: 66 U/L (ref 46–116)
ALT SERPL W P-5'-P-CCNC: 21 U/L (ref 12–78)
ANION GAP SERPL CALCULATED.3IONS-SCNC: 5 MMOL/L
AST SERPL W P-5'-P-CCNC: 13 U/L (ref 5–45)
BASOPHILS # BLD AUTO: 0.05 THOUSANDS/ÂΜL (ref 0–0.1)
BASOPHILS NFR BLD AUTO: 1 % (ref 0–1)
BILIRUB SERPL-MCNC: 0.36 MG/DL (ref 0.2–1)
BUN SERPL-MCNC: 7 MG/DL (ref 5–25)
CALCIUM SERPL-MCNC: 9.1 MG/DL (ref 8.3–10.1)
CHLORIDE SERPL-SCNC: 108 MMOL/L (ref 96–108)
CHOLEST SERPL-MCNC: 194 MG/DL
CO2 SERPL-SCNC: 28 MMOL/L (ref 21–32)
CREAT SERPL-MCNC: 0.85 MG/DL (ref 0.6–1.3)
EOSINOPHIL # BLD AUTO: 0.15 THOUSAND/ÂΜL (ref 0–0.61)
EOSINOPHIL NFR BLD AUTO: 3 % (ref 0–6)
ERYTHROCYTE [DISTWIDTH] IN BLOOD BY AUTOMATED COUNT: 12.9 % (ref 11.6–15.1)
GFR SERPL CREATININE-BSD FRML MDRD: 103 ML/MIN/1.73SQ M
GLUCOSE P FAST SERPL-MCNC: 82 MG/DL (ref 65–99)
HCT VFR BLD AUTO: 46 % (ref 36.5–49.3)
HDLC SERPL-MCNC: 67 MG/DL
HGB BLD-MCNC: 14.9 G/DL (ref 12–17)
IMM GRANULOCYTES # BLD AUTO: 0 THOUSAND/UL (ref 0–0.2)
IMM GRANULOCYTES NFR BLD AUTO: 0 % (ref 0–2)
LDLC SERPL CALC-MCNC: 105 MG/DL (ref 0–100)
LYMPHOCYTES # BLD AUTO: 1.72 THOUSANDS/ÂΜL (ref 0.6–4.47)
LYMPHOCYTES NFR BLD AUTO: 32 % (ref 14–44)
MCH RBC QN AUTO: 30 PG (ref 26.8–34.3)
MCHC RBC AUTO-ENTMCNC: 32.4 G/DL (ref 31.4–37.4)
MCV RBC AUTO: 93 FL (ref 82–98)
MONOCYTES # BLD AUTO: 0.47 THOUSAND/ÂΜL (ref 0.17–1.22)
MONOCYTES NFR BLD AUTO: 9 % (ref 4–12)
NEUTROPHILS # BLD AUTO: 2.99 THOUSANDS/ÂΜL (ref 1.85–7.62)
NEUTS SEG NFR BLD AUTO: 55 % (ref 43–75)
NRBC BLD AUTO-RTO: 0 /100 WBCS
PLATELET # BLD AUTO: 244 THOUSANDS/UL (ref 149–390)
PMV BLD AUTO: 9.9 FL (ref 8.9–12.7)
POTASSIUM SERPL-SCNC: 3.8 MMOL/L (ref 3.5–5.3)
PROT SERPL-MCNC: 6.9 G/DL (ref 6.4–8.4)
RBC # BLD AUTO: 4.97 MILLION/UL (ref 3.88–5.62)
SODIUM SERPL-SCNC: 141 MMOL/L (ref 135–147)
TRIGL SERPL-MCNC: 111 MG/DL
WBC # BLD AUTO: 5.38 THOUSAND/UL (ref 4.31–10.16)

## 2023-08-09 PROCEDURE — 80061 LIPID PANEL: CPT

## 2023-08-09 PROCEDURE — 80053 COMPREHEN METABOLIC PANEL: CPT

## 2023-08-09 PROCEDURE — 36415 COLL VENOUS BLD VENIPUNCTURE: CPT

## 2023-08-09 PROCEDURE — 82306 VITAMIN D 25 HYDROXY: CPT

## 2023-08-09 PROCEDURE — 85025 COMPLETE CBC W/AUTO DIFF WBC: CPT

## 2023-10-24 DIAGNOSIS — F41.9 ANXIETY: ICD-10-CM

## 2023-10-25 RX ORDER — LORAZEPAM 0.5 MG/1
0.5 TABLET ORAL DAILY PRN
Qty: 30 TABLET | Refills: 0 | Status: SHIPPED | OUTPATIENT
Start: 2023-10-25

## 2023-11-20 ENCOUNTER — TELEPHONE (OUTPATIENT)
Dept: FAMILY MEDICINE CLINIC | Facility: CLINIC | Age: 48
End: 2023-11-20

## 2023-11-20 DIAGNOSIS — Q23.8 QUADRICUSPID AORTIC VALVE: Primary | ICD-10-CM

## 2023-11-20 RX ORDER — AMOXICILLIN 500 MG/1
CAPSULE ORAL
Qty: 12 CAPSULE | Refills: 3 | Status: SHIPPED | OUTPATIENT
Start: 2023-11-20 | End: 2024-11-01

## 2023-11-20 RX ORDER — AMOXICILLIN 500 MG/1
500 CAPSULE ORAL EVERY 8 HOURS SCHEDULED
COMMUNITY
End: 2023-11-20 | Stop reason: SDUPTHER

## 2024-03-16 DIAGNOSIS — F41.9 ANXIETY: ICD-10-CM

## 2024-03-16 DIAGNOSIS — Q23.8 QUADRICUSPID AORTIC VALVE: ICD-10-CM

## 2024-03-18 RX ORDER — LORAZEPAM 0.5 MG/1
0.5 TABLET ORAL DAILY PRN
Qty: 30 TABLET | Refills: 0 | Status: SHIPPED | OUTPATIENT
Start: 2024-03-18

## 2024-03-18 RX ORDER — AMOXICILLIN 500 MG/1
CAPSULE ORAL
Qty: 12 CAPSULE | Refills: 0 | Status: SHIPPED | OUTPATIENT
Start: 2024-03-18 | End: 2025-02-26

## 2024-09-23 DIAGNOSIS — F41.9 ANXIETY: ICD-10-CM

## 2024-09-25 RX ORDER — LORAZEPAM 0.5 MG/1
0.5 TABLET ORAL DAILY PRN
Qty: 30 TABLET | Refills: 0 | Status: SHIPPED | OUTPATIENT
Start: 2024-09-25

## 2024-11-07 ENCOUNTER — OFFICE VISIT (OUTPATIENT)
Dept: URGENT CARE | Facility: MEDICAL CENTER | Age: 49
End: 2024-11-07
Payer: COMMERCIAL

## 2024-11-07 VITALS
SYSTOLIC BLOOD PRESSURE: 116 MMHG | HEART RATE: 72 BPM | HEIGHT: 71 IN | OXYGEN SATURATION: 97 % | DIASTOLIC BLOOD PRESSURE: 71 MMHG | RESPIRATION RATE: 18 BRPM | BODY MASS INDEX: 21.56 KG/M2 | WEIGHT: 154 LBS | TEMPERATURE: 97.8 F

## 2024-11-07 DIAGNOSIS — B34.9 ACUTE VIRAL SYNDROME: Primary | ICD-10-CM

## 2024-11-07 PROCEDURE — S9083 URGENT CARE CENTER GLOBAL: HCPCS

## 2024-11-07 PROCEDURE — G0382 LEV 3 HOSP TYPE B ED VISIT: HCPCS

## 2024-11-07 RX ORDER — BENZONATATE 200 MG/1
200 CAPSULE ORAL 3 TIMES DAILY PRN
Qty: 20 CAPSULE | Refills: 0 | Status: SHIPPED | OUTPATIENT
Start: 2024-11-07 | End: 2024-11-14

## 2024-11-07 NOTE — PROGRESS NOTES
[facility]        NAME: Evens Irizarry is a 49 y.o. male  : 1975    MRN: [id number]  DATE: 2024  TIME: 8:19 AM    Assessment and Plan   Acute viral syndrome [B34.9]  1. Acute viral syndrome  benzonatate (TESSALON) 200 MG capsule            Patient Instructions   Saline nasal spray as often as needed  Flonase nasal spray to each nostril daily  Mucinex in the blue box as directed   Tessalon pearls as needed    Follow up with PCP in 3-5 days.  Proceed to  ER if symptoms worsen.    If tests have been performed at [facility], our office will contact you with results if changes need to be made to the care plan discussed with you at the visit.  [facility]    Chief Complaint     Chief Complaint   Patient presents with   • Cough     Pt who had cold sx starting ~ 8 days ago - sx resolved but dry cough persists - takiing OTC meds w/little relief.  Home covid test (-)         History of Present Illness       Cough      Review of Systems   Review of Systems   Respiratory:  Positive for cough.          Current Medications       Current Outpatient Medications:   •  benzonatate (TESSALON) 200 MG capsule, Take 1 capsule (200 mg total) by mouth 3 (three) times a day as needed for cough for up to 7 days, Disp: 20 capsule, Rfl: 0  •  cyanocobalamin (VITAMIN B-12) 100 mcg tablet, Take 1 tablet by mouth every other day, Disp: , Rfl:   •  ergocalciferol (ERGOCALCIFEROL) 1.25 MG (01886 UT) capsule, Take 1 capsule (50,000 Units total) by mouth once a week, Disp: 12 capsule, Rfl: 0  •  fluticasone (FLONASE) 50 mcg/act nasal spray, 2 sprays daily, Disp: , Rfl:   •  LORazepam (ATIVAN) 0.5 mg tablet, Take 1 tablet (0.5 mg total) by mouth daily as needed for anxiety, Disp: 30 tablet, Rfl: 0  •  amoxicillin (AMOXIL) 500 mg capsule, Take 4 tabs 1 hour prior to dental work. (Patient not taking: Reported on 2024), Disp: 12 capsule, Rfl: 0  •  methylPREDNISolone 4 MG tablet  "therapy pack, Use as directed on package (Patient not taking: Reported on 5/1/2023), Disp: 21 tablet, Rfl: 0  •  Promethazine-DM (PHENERGAN-DM) 6.25-15 mg/5 mL oral syrup, Take 5 mL by mouth 4 (four) times a day as needed for cough (Patient not taking: Reported on 5/1/2023), Disp: 180 mL, Rfl: 0    Current Allergies     Allergies as of 11/07/2024 - Reviewed 11/07/2024   Allergen Reaction Noted   • Eggs or egg-derived products - food allergy  03/02/2019   • Iron  10/22/2012   • Meat extract  03/02/2019   • Tilactase  03/02/2019            The following portions of the patient's history were reviewed and updated as appropriate: allergies, current medications, past family history, past medical history, past social history, past surgical history and problem list.     Past Medical History:   Diagnosis Date   • Aortic valve, quadricuspid    • Campylobacter diarrhea 8/11/2020   • Cough variant asthma     last assessed: 8/14/2016   • CRP elevated 9/1/2021   • Depression     last assessed: 10/22/2012   • Palpitations     last assessed: 5/19/2014       Past Surgical History:   Procedure Laterality Date   • TONSILLECTOMY         Family History   Problem Relation Age of Onset   • Depression Mother    • Aortic aneurysm Father          Medications have been verified.        Objective   /71   Pulse 72   Temp 97.8 °F (36.6 °C) (Tympanic)   Resp 18   Ht 5' 11\" (1.803 m)   Wt 69.9 kg (154 lb)   SpO2 97%   BMI 21.48 kg/m²   No LMP for male patient.       Physical Exam     Physical Exam              "

## 2024-11-07 NOTE — PROGRESS NOTES
[facility]        NAME: Evens Irizarry is a 49 y.o. male  : 1975    MRN: [id number]  DATE: 2024  TIME: 8:24 AM    Assessment and Plan   Acute viral syndrome [B34.9]  1. Acute viral syndrome  benzonatate (TESSALON) 200 MG capsule            Patient Instructions   Saline nasal spray as often as needed  Flonase nasal spray to each nostril daily  Mucinex in the blue box as directed   Tessalon pearls as needed    Follow up with PCP in 3-5 days.  Proceed to  ER if symptoms worsen.    If tests have been performed at [facility], our office will contact you with results if changes need to be made to the care plan discussed with you at the visit.  [facility]    Chief Complaint     Chief Complaint   Patient presents with    Cough     Pt who had cold sx starting ~ 8 days ago - sx resolved but dry cough persists - takiing OTC meds w/little relief.  Home covid test (-)         History of Present Illness       This is a 49-year-old male who presents today with lingering cough.  He states he was traveling in [place] a week ago was started with congestion cough fever and bringing up mucus.  He denies any further fever chills states he does have congestion and postnasal drip and dry cough.  He has been taking DayQuil and NyQuil for a week and states he has been taking it too long.  He did call his PCP they told him to come in to be tested for the flu he did have a negative COVID test over the weekend    Cough  Associated symptoms include a fever and postnasal drip. Pertinent negatives include no sore throat or shortness of breath.       Review of Systems   Review of Systems   Constitutional:  Positive for fever. Negative for fatigue.   HENT:  Positive for congestion and postnasal drip. Negative for sore throat.    Respiratory:  Positive for cough. Negative for shortness of breath.    Cardiovascular: Negative.    Gastrointestinal: Negative.    Genitourinary:  Negative.    Musculoskeletal: Negative.    Neurological: Negative.          Current Medications       Current Outpatient Medications:     benzonatate (TESSALON) 200 MG capsule, Take 1 capsule (200 mg total) by mouth 3 (three) times a day as needed for cough for up to 7 days, Disp: 20 capsule, Rfl: 0    cyanocobalamin (VITAMIN B-12) 100 mcg tablet, Take 1 tablet by mouth every other day, Disp: , Rfl:     ergocalciferol (ERGOCALCIFEROL) 1.25 MG (58560 UT) capsule, Take 1 capsule (50,000 Units total) by mouth once a week, Disp: 12 capsule, Rfl: 0    fluticasone (FLONASE) 50 mcg/act nasal spray, 2 sprays daily, Disp: , Rfl:     LORazepam (ATIVAN) 0.5 mg tablet, Take 1 tablet (0.5 mg total) by mouth daily as needed for anxiety, Disp: 30 tablet, Rfl: 0    amoxicillin (AMOXIL) 500 mg capsule, Take 4 tabs 1 hour prior to dental work. (Patient not taking: Reported on 11/7/2024), Disp: 12 capsule, Rfl: 0    methylPREDNISolone 4 MG tablet therapy pack, Use as directed on package (Patient not taking: Reported on 5/1/2023), Disp: 21 tablet, Rfl: 0    Promethazine-DM (PHENERGAN-DM) 6.25-15 mg/5 mL oral syrup, Take 5 mL by mouth 4 (four) times a day as needed for cough (Patient not taking: Reported on 5/1/2023), Disp: 180 mL, Rfl: 0    Current Allergies     Allergies as of 11/07/2024 - Reviewed 11/07/2024   Allergen Reaction Noted    Eggs or egg-derived products - food allergy  03/02/2019    Iron  10/22/2012    Meat extract  03/02/2019    Tilactase  03/02/2019            The following portions of the patient's history were reviewed and updated as appropriate: allergies, current medications, past family history, past medical history, past social history, past surgical history and problem list.     Past Medical History:   Diagnosis Date    Aortic valve, quadricuspid     Campylobacter diarrhea 8/11/2020    Cough variant asthma     last assessed: 8/14/2016    CRP elevated 9/1/2021    Depression     last assessed: 10/22/2012     "Palpitations     last assessed: 5/19/2014       Past Surgical History:   Procedure Laterality Date    TONSILLECTOMY         Family History   Problem Relation Age of Onset    Depression Mother     Aortic aneurysm Father          Medications have been verified.        Objective   /71   Pulse 72   Temp 97.8 °F (36.6 °C) (Tympanic)   Resp 18   Ht 5' 11\" (1.803 m)   Wt 69.9 kg (154 lb)   SpO2 97%   BMI 21.48 kg/m²   No LMP for male patient.       Physical Exam     Physical Exam  Constitutional:       Appearance: Normal appearance.   HENT:      Head: Normocephalic and atraumatic.      Right Ear: Tympanic membrane, ear canal and external ear normal.      Left Ear: Tympanic membrane, ear canal and external ear normal.      Nose: Nose normal.      Mouth/Throat:      Mouth: Mucous membranes are moist.      Pharynx: Oropharynx is clear. Posterior oropharyngeal erythema present.   Eyes:      Conjunctiva/sclera: Conjunctivae normal.      Pupils: Pupils are equal, round, and reactive to light.   Cardiovascular:      Rate and Rhythm: Normal rate and regular rhythm.      Pulses: Normal pulses.      Heart sounds: Normal heart sounds.   Pulmonary:      Effort: Pulmonary effort is normal.      Breath sounds: Normal breath sounds. No wheezing, rhonchi or rales.   Abdominal:      General: Abdomen is flat. Bowel sounds are normal.   Musculoskeletal:         General: Normal range of motion.   Skin:     General: Skin is warm.      Capillary Refill: Capillary refill takes less than 2 seconds.   Neurological:      General: No focal deficit present.      Mental Status: He is alert and oriented to person, place, and time.   Psychiatric:         Mood and Affect: Mood normal.         Thought Content: Thought content normal.         Judgment: Judgment normal.                   "

## 2024-11-07 NOTE — PATIENT INSTRUCTIONS
Saline nasal spray as often as needed  Flonase nasal spray to each nostril daily  Mucinex in the blue box as directed   Tessalon pearls as needed

## 2025-02-11 DIAGNOSIS — F41.9 ANXIETY: ICD-10-CM

## 2025-02-12 RX ORDER — LORAZEPAM 0.5 MG/1
0.5 TABLET ORAL DAILY PRN
Qty: 30 TABLET | Refills: 0 | Status: SHIPPED | OUTPATIENT
Start: 2025-02-12

## 2025-02-26 ENCOUNTER — OFFICE VISIT (OUTPATIENT)
Dept: FAMILY MEDICINE CLINIC | Facility: CLINIC | Age: 50
End: 2025-02-26
Payer: COMMERCIAL

## 2025-02-26 VITALS
HEIGHT: 71 IN | SYSTOLIC BLOOD PRESSURE: 106 MMHG | HEART RATE: 79 BPM | BODY MASS INDEX: 22.12 KG/M2 | DIASTOLIC BLOOD PRESSURE: 80 MMHG | RESPIRATION RATE: 12 BRPM | WEIGHT: 158 LBS | OXYGEN SATURATION: 98 %

## 2025-02-26 DIAGNOSIS — Q23.1 CONGENITAL INSUFFICIENCY OF AORTIC VALVE: ICD-10-CM

## 2025-02-26 DIAGNOSIS — I35.9 AORTIC VALVE DISORDER: ICD-10-CM

## 2025-02-26 DIAGNOSIS — R35.89 POLYURIA: ICD-10-CM

## 2025-02-26 DIAGNOSIS — Z00.00 WELL ADULT ON ROUTINE HEALTH CHECK: Primary | ICD-10-CM

## 2025-02-26 DIAGNOSIS — Z12.11 COLON CANCER SCREENING: ICD-10-CM

## 2025-02-26 DIAGNOSIS — E55.9 VITAMIN D DEFICIENCY: ICD-10-CM

## 2025-02-26 DIAGNOSIS — E78.2 MIXED HYPERLIPIDEMIA: ICD-10-CM

## 2025-02-26 DIAGNOSIS — Q23.88 QUADRICUSPID AORTIC VALVE: ICD-10-CM

## 2025-02-26 PROBLEM — U07.1 COVID-19: Status: RESOLVED | Noted: 2023-03-27 | Resolved: 2025-02-26

## 2025-02-26 PROCEDURE — 99396 PREV VISIT EST AGE 40-64: CPT | Performed by: FAMILY MEDICINE

## 2025-02-26 RX ORDER — AMOXICILLIN 500 MG/1
CAPSULE ORAL
Qty: 12 CAPSULE | Refills: 3 | Status: SHIPPED | OUTPATIENT
Start: 2025-02-26 | End: 2026-02-06

## 2025-02-26 NOTE — ASSESSMENT & PLAN NOTE
Continue follow-up with [facility]        [FreeTextEntry1] : chect ct abd and pelvis ro appendicitis BRAT diet\par  If Increased abd pain go to the ER\par Activity as tolerated\par Go to ER if worse chest pain or shortness of breath.\par

## 2025-02-26 NOTE — PROGRESS NOTES
Adult Annual Physical  Name: Evens Irizarry      : 1975      MRN: [id number]  Encounter Provider: Gunner Andujar Jr, MD  Encounter Date: 2025   Encounter department: [facility]    Assessment & Plan  Well adult on routine health check  He remains quite healthy at this time.  He declines flu or COVID-vaccine.  Tdap is up-to-date.  He is due for colon cancer screening and I have sent him a Cologuard.       Quadricuspid aortic valve  Continue close follow-up with [facility].  Amoxicillin was refilled for dental work.  Fortunately, he remains clinically stable and recent echo as well as MRI looked quite stable down at [place].  Orders:    amoxicillin (AMOXIL) 500 mg capsule; Take 4 tabs 1 hour prior to dental work.    Mixed hyperlipidemia  Continue routine monitoring of his lipids.  Check fasting labs.  Orders:    Comprehensive metabolic panel; Future    CBC and differential; Future    Lipid Panel with Direct LDL reflex; Future    Vitamin D deficiency    Orders:    Vitamin D 25 hydroxy; Future    Congenital insufficiency of aortic valve  Continue follow-up with [facility]       Aortic valve disorder    Orders:    Comprehensive metabolic panel; Future    CBC and differential; Future    Lipid Panel with Direct LDL reflex; Future    Polyuria  He is having some polyuria likely explained by excellent hydration.  Check UA to rule out other issues.  Orders:    Comprehensive metabolic panel; Future    CBC and differential; Future    UA (URINE) with reflex to Scope; Future    Colon cancer screening    Orders:    Cologuard    Immunizations and preventive care screenings were discussed with patient today. Appropriate education was printed on patient's after visit summary.        Counseling:  Alcohol/drug use: discussed moderation in alcohol intake, the recommendations for healthy alcohol use, and avoidance of illicit drug use.  Dental Health: discussed  "importance of regular tooth brushing, flossing, and dental visits.  Exercise: the importance of regular exercise/physical activity was discussed. Recommend exercise 3-5 times per week for at least 30 minutes.          History of Present Illness     Adult Annual Physical  Review of Systems   Constitutional:  Negative for appetite change, chills, fatigue, fever and unexpected weight change.   HENT:  Negative for trouble swallowing.    Eyes:  Negative for visual disturbance.   Respiratory:  Negative for cough, chest tightness, shortness of breath and wheezing.    Cardiovascular:  Negative for chest pain, palpitations and leg swelling.   Gastrointestinal:  Negative for abdominal distention, abdominal pain, blood in stool, constipation and diarrhea.   Endocrine: Negative for polyuria.   Genitourinary:  Negative for difficulty urinating and flank pain.   Musculoskeletal:  Negative for arthralgias and myalgias.   Skin:  Negative for rash.   Neurological:  Negative for dizziness and light-headedness.   Hematological:  Negative for adenopathy. Does not bruise/bleed easily.   Psychiatric/Behavioral:  Negative for dysphoric mood and sleep disturbance. The patient is not nervous/anxious.          Objective   /80 (BP Location: Left arm, Patient Position: Sitting, Cuff Size: Standard)   Pulse 79   Resp 12   Ht 5' 11\" (1.803 m)   Wt 71.7 kg (158 lb)   SpO2 98%   BMI 22.04 kg/m²     Physical Exam  Constitutional:       General: He is not in acute distress.     Appearance: Normal appearance. He is well-developed. He is not diaphoretic.   HENT:      Head: Normocephalic.      Right Ear: External ear normal.      Left Ear: External ear normal.      Nose: Nose normal.   Eyes:      General:         Right eye: No discharge.         Left eye: No discharge.      Conjunctiva/sclera: Conjunctivae normal.      Pupils: Pupils are equal, round, and reactive to light.   Neck:      Thyroid: No thyromegaly.      Trachea: No tracheal " deviation.   Cardiovascular:      Rate and Rhythm: Normal rate and regular rhythm.      Heart sounds: Normal heart sounds. No murmur heard.     No friction rub.   Pulmonary:      Effort: Pulmonary effort is normal. No respiratory distress.      Breath sounds: Normal breath sounds. No wheezing.   Chest:      Chest wall: No tenderness.   Abdominal:      General: There is no distension.      Palpations: There is no mass.      Tenderness: There is no abdominal tenderness. There is no guarding or rebound.      Hernia: No hernia is present.   Musculoskeletal:         General: No swelling or deformity.      Cervical back: Normal range of motion.      Right lower leg: No edema.      Left lower leg: No edema.   Skin:     Findings: No erythema or rash.   Neurological:      General: No focal deficit present.      Mental Status: He is alert.      Cranial Nerves: No cranial nerve deficit.      Coordination: Coordination normal.   Psychiatric:         Thought Content: Thought content normal.

## 2025-02-26 NOTE — ASSESSMENT & PLAN NOTE
Continue routine monitoring of his lipids.  Check fasting labs.  Orders:    Comprehensive metabolic panel; Future    CBC and differential; Future    Lipid Panel with Direct LDL reflex; Future

## 2025-02-26 NOTE — ASSESSMENT & PLAN NOTE
Continue close follow-up with [facility].  Amoxicillin was refilled for dental work.  Fortunately, he remains clinically stable and recent echo as well as MRI looked quite stable down at [place].  Orders:    amoxicillin (AMOXIL) 500 mg capsule; Take 4 tabs 1 hour prior to dental work.

## 2025-03-27 LAB — COLOGUARD RESULT REPORTABLE: NEGATIVE

## 2025-03-28 ENCOUNTER — RESULTS FOLLOW-UP (OUTPATIENT)
Dept: FAMILY MEDICINE CLINIC | Facility: CLINIC | Age: 50
End: 2025-03-28